# Patient Record
Sex: FEMALE | Race: WHITE | NOT HISPANIC OR LATINO | Employment: STUDENT | URBAN - METROPOLITAN AREA
[De-identification: names, ages, dates, MRNs, and addresses within clinical notes are randomized per-mention and may not be internally consistent; named-entity substitution may affect disease eponyms.]

---
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
## 2019-07-09 ENCOUNTER — HOSPITAL ENCOUNTER (EMERGENCY)
Facility: HOSPITAL | Age: 19
Discharge: HOME | End: 2019-07-09
Attending: EMERGENCY MEDICINE
Payer: MEDICAID

## 2019-07-09 VITALS
RESPIRATION RATE: 18 BRPM | WEIGHT: 160 LBS | OXYGEN SATURATION: 98 % | BODY MASS INDEX: 30.21 KG/M2 | DIASTOLIC BLOOD PRESSURE: 66 MMHG | HEIGHT: 61 IN | SYSTOLIC BLOOD PRESSURE: 110 MMHG | TEMPERATURE: 98 F | HEART RATE: 93 BPM

## 2019-07-09 DIAGNOSIS — J02.9 PHARYNGITIS, UNSPECIFIED ETIOLOGY: ICD-10-CM

## 2019-07-09 DIAGNOSIS — R51.9 NONINTRACTABLE HEADACHE, UNSPECIFIED CHRONICITY PATTERN, UNSPECIFIED HEADACHE TYPE: Primary | ICD-10-CM

## 2019-07-09 LAB
B-HCG UR QL: NEGATIVE
BILIRUB UR QL STRIP: NEGATIVE
CLARITY UR: CLEAR
COLOR UR: YELLOW
CTP QC/QA: YES
DEPRECATED S PYO AG THROAT QL EIA: NEGATIVE
GLUCOSE UR QL STRIP: NEGATIVE
HGB UR QL STRIP: NEGATIVE
KETONES UR QL STRIP: NEGATIVE
LEUKOCYTE ESTERASE UR QL STRIP: NEGATIVE
NITRITE UR QL STRIP: NEGATIVE
PH UR STRIP: 5 [PH] (ref 5–8)
PROT UR QL STRIP: NEGATIVE
SP GR UR STRIP: 1.01 (ref 1–1.03)
URN SPEC COLLECT METH UR: NORMAL
UROBILINOGEN UR STRIP-ACNC: NEGATIVE EU/DL

## 2019-07-09 PROCEDURE — 81025 URINE PREGNANCY TEST: CPT | Performed by: PHYSICIAN ASSISTANT

## 2019-07-09 PROCEDURE — 87880 STREP A ASSAY W/OPTIC: CPT

## 2019-07-09 PROCEDURE — 25000003 PHARM REV CODE 250: Performed by: PHYSICIAN ASSISTANT

## 2019-07-09 PROCEDURE — 81003 URINALYSIS AUTO W/O SCOPE: CPT

## 2019-07-09 PROCEDURE — 87081 CULTURE SCREEN ONLY: CPT

## 2019-07-09 PROCEDURE — 99283 EMERGENCY DEPT VISIT LOW MDM: CPT

## 2019-07-09 RX ORDER — IBUPROFEN 600 MG/1
600 TABLET ORAL
Status: COMPLETED | OUTPATIENT
Start: 2019-07-09 | End: 2019-07-09

## 2019-07-09 RX ORDER — MELOXICAM 7.5 MG/1
7.5 TABLET ORAL DAILY
Qty: 12 TABLET | Refills: 0 | Status: SHIPPED | OUTPATIENT
Start: 2019-07-09 | End: 2020-02-07 | Stop reason: HOSPADM

## 2019-07-09 RX ORDER — ACETAMINOPHEN 500 MG
1000 TABLET ORAL
Status: COMPLETED | OUTPATIENT
Start: 2019-07-09 | End: 2019-07-09

## 2019-07-09 RX ADMIN — IBUPROFEN 600 MG: 600 TABLET ORAL at 02:07

## 2019-07-09 RX ADMIN — ACETAMINOPHEN 1000 MG: 500 TABLET ORAL at 02:07

## 2019-07-09 NOTE — ED PROVIDER NOTES
Encounter Date: 7/9/2019       History     Chief Complaint   Patient presents with    Fever     Pt with 4 day hx of fever, headache, ear pain and body aches. Pt last ran fever yesterday. Last took Motrin yesterday.     Headache     18-year-old female with no past medical history presents to emergency department for 4 day by temporal headache that is gradual in onset.  Notes associated sore throat, bilateral otalgia, nausea, and mild periumbilical abdominal pain that is intermittent.  Temporary relief with Motrin.  No medications taken prior to arrival.  Notes that she has sick contacts at home with similar symptoms. Denies emesis, urinary symptoms, diarrhea, chest pain, shortness of breath. No history of similar symptoms.  No recent use of antibiotics or hospitalization.  No history of pneumonia.        Review of patient's allergies indicates:   Allergen Reactions    Penicillins      Past Medical History:   Diagnosis Date    Herpes      History reviewed. No pertinent surgical history.  Family History   Problem Relation Age of Onset    Breast cancer Neg Hx     Colon cancer Neg Hx     Ovarian cancer Neg Hx      Social History     Tobacco Use    Smoking status: Never Smoker    Smokeless tobacco: Never Used   Substance Use Topics    Alcohol use: No    Drug use: No     Review of Systems   Constitutional: Positive for fever.   HENT: Positive for ear pain and sore throat. Negative for congestion, trouble swallowing and voice change.    Respiratory: Positive for cough. Negative for shortness of breath and wheezing.    Cardiovascular: Negative for chest pain.   Gastrointestinal: Positive for abdominal pain. Negative for diarrhea, nausea and vomiting.   Genitourinary: Negative for dysuria, flank pain, frequency, hematuria, urgency, vaginal bleeding and vaginal discharge.   Musculoskeletal: Negative for back pain and neck pain.   Skin: Negative for rash.   Neurological: Positive for headaches.   All other systems  reviewed and are negative.      Physical Exam     Initial Vitals [07/09/19 1316]   BP Pulse Resp Temp SpO2   120/68 110 20 98.7 °F (37.1 °C) 97 %      MAP       --         Physical Exam    Nursing note and vitals reviewed.  Constitutional: She appears well-developed and well-nourished. She is not diaphoretic. No distress.   HENT:   Head: Normocephalic and atraumatic.   Right Ear: Tympanic membrane, external ear and ear canal normal. No mastoid tenderness.   Left Ear: Tympanic membrane, external ear and ear canal normal. No mastoid tenderness.   Nose: Nose normal. Right sinus exhibits no maxillary sinus tenderness and no frontal sinus tenderness. Left sinus exhibits no maxillary sinus tenderness and no frontal sinus tenderness.   Mouth/Throat: Uvula is midline and oropharynx is clear and moist. No trismus in the jaw. No dental abscesses or uvula swelling. No oropharyngeal exudate, posterior oropharyngeal edema, posterior oropharyngeal erythema or tonsillar abscesses.   Eyes: Conjunctivae and EOM are normal. Right eye exhibits no discharge. Left eye exhibits no discharge.   Neck: Normal range of motion. No tracheal deviation present. No JVD present.   Cardiovascular: Normal rate, regular rhythm and normal heart sounds. Exam reveals no friction rub.    No murmur heard.  Pulmonary/Chest: Breath sounds normal. No stridor. No respiratory distress. She has no wheezes. She has no rhonchi. She has no rales. She exhibits no tenderness.   Abdominal: Soft. She exhibits no distension. There is no tenderness. There is no rigidity, no rebound, no guarding, no CVA tenderness, no tenderness at McBurney's point and negative Schulte's sign.   Musculoskeletal: Normal range of motion.   Lymphadenopathy:     She has cervical adenopathy (bilateral mildly tender anterior; worse on the R).   Neurological: She is alert and oriented to person, place, and time.   Skin: Skin is warm and dry. No rash and no abscess noted. No erythema. No pallor.          ED Course   Procedures  Labs Reviewed   THROAT SCREEN, RAPID   CULTURE, STREP A,  THROAT   URINALYSIS, REFLEX TO URINE CULTURE    Narrative:     Preferred Collection Type->Urine, Clean Catch   POCT URINE PREGNANCY          Imaging Results    None          Medical Decision Making:   History:   Old Medical Records: I decided to obtain old medical records.  Initial Assessment:   18-year-old female with headache and neck pain  Clinical Tests:   Lab Tests: Ordered and Reviewed  ED Management:  Strep negative. No peritonsillar abscess or meningeal signs. No acute otitis media, otitis externa, or mastoiditis.  Low suspicion for pneumonia this time.  No UTI.  Not consistent with pyelonephritis.  Also carefully considered but doubt acute appendicitis, obstruction, and ureteral stone.  Does not endorse symptoms concerning for PID at this time. Kl[place] SAH negative. I am unsure of the etiology of this patient's symptoms, however, I do not believe they are of emergent etiology at this time. Likely viral.  Symptoms improved in the ED.  Advising follow-up with PCP.  Strict return precautions discussed with patient.  Patient agreeable to plan.                      Clinical Impression:       ICD-10-CM ICD-9-CM   1. Nonintractable headache, unspecified chronicity pattern, unspecified headache type R51 784.0   2. Pharyngitis, unspecified etiology J02.9 462                                Anish Monterroso PA-C  07/09/19 1512

## 2019-07-11 LAB — BACTERIA THROAT CULT: NORMAL

## 2020-01-17 ENCOUNTER — HOSPITAL ENCOUNTER (EMERGENCY)
Facility: HOSPITAL | Age: 20
Discharge: HOME | End: 2020-01-17
Attending: EMERGENCY MEDICINE
Payer: MEDICAID

## 2020-01-17 VITALS
WEIGHT: 160 LBS | TEMPERATURE: 98 F | RESPIRATION RATE: 16 BRPM | BODY MASS INDEX: 30.21 KG/M2 | DIASTOLIC BLOOD PRESSURE: 62 MMHG | HEIGHT: 61 IN | SYSTOLIC BLOOD PRESSURE: 116 MMHG | OXYGEN SATURATION: 99 % | HEART RATE: 87 BPM

## 2020-01-17 DIAGNOSIS — N30.01 ACUTE CYSTITIS WITH HEMATURIA: Primary | ICD-10-CM

## 2020-01-17 LAB
B-HCG UR QL: NEGATIVE
BILIRUBIN, POC UA: NEGATIVE
BLOOD, POC UA: ABNORMAL
CLARITY, POC UA: ABNORMAL
COLOR, POC UA: ABNORMAL
CTP QC/QA: YES
GLUCOSE, POC UA: NEGATIVE
KETONES, POC UA: NEGATIVE
LEUKOCYTE EST, POC UA: ABNORMAL
NITRITE, POC UA: NEGATIVE
PH UR STRIP: 5.5 [PH]
PROTEIN, POC UA: ABNORMAL
SPECIFIC GRAVITY, POC UA: >=1.03
UROBILINOGEN, POC UA: 1 E.U./DL

## 2020-01-17 PROCEDURE — 81003 URINALYSIS AUTO W/O SCOPE: CPT | Mod: ER

## 2020-01-17 PROCEDURE — 99284 EMERGENCY DEPT VISIT MOD MDM: CPT | Mod: 25,ER

## 2020-01-17 PROCEDURE — 81025 URINE PREGNANCY TEST: CPT | Mod: ER | Performed by: EMERGENCY MEDICINE

## 2020-01-17 RX ORDER — NITROFURANTOIN 25; 75 MG/1; MG/1
100 CAPSULE ORAL 2 TIMES DAILY
Qty: 10 CAPSULE | Refills: 0 | Status: SHIPPED | OUTPATIENT
Start: 2020-01-17 | End: 2020-01-22

## 2020-01-17 RX ORDER — PHENAZOPYRIDINE HYDROCHLORIDE 200 MG/1
200 TABLET, FILM COATED ORAL 3 TIMES DAILY
Qty: 6 TABLET | Refills: 0 | Status: SHIPPED | OUTPATIENT
Start: 2020-01-17 | End: 2020-01-27

## 2020-01-17 NOTE — ED PROVIDER NOTES
Encounter Date: 1/17/2020       History     Chief Complaint   Patient presents with    Dysuria     pt c/o dysuria and hematuria x 3 days     19 y.o. female presents to emergency department complaining of acute dysuria, hematuria, urgency and frequency that began three days ago.  She denies abdominal pain, flank pain, fever, nausea, vomiting or vaginal discharge.  She reports recently being treated for strep throat and states she has completed a 10 day course of Bactrim 3 days ago.        Review of patient's allergies indicates:   Allergen Reactions    Amoxicillin     Penicillins      Past Medical History:   Diagnosis Date    Herpes      Past Surgical History:   Procedure Laterality Date    lasix       Family History   Problem Relation Age of Onset    Breast cancer Neg Hx     Colon cancer Neg Hx     Ovarian cancer Neg Hx      Social History     Tobacco Use    Smoking status: Never Smoker    Smokeless tobacco: Never Used   Substance Use Topics    Alcohol use: No    Drug use: No     Review of Systems   Constitutional: Negative for fever.   Gastrointestinal: Negative for abdominal pain, constipation, diarrhea, nausea and vomiting.   Genitourinary: Positive for dysuria, frequency and hematuria. Negative for decreased urine volume, difficulty urinating, flank pain and vaginal discharge.   All other systems reviewed and are negative.      Physical Exam     Initial Vitals [01/17/20 0143]   BP Pulse Resp Temp SpO2   110/64 94 18 98.1 °F (36.7 °C) 98 %      MAP       --         Physical Exam    Nursing note and vitals reviewed.  Constitutional: She appears well-developed and well-nourished. She is not diaphoretic. No distress.   HENT:   Head: Normocephalic and atraumatic.   Eyes: Conjunctivae are normal. Pupils are equal, round, and reactive to light.   Neck: Normal range of motion. Neck supple.   Cardiovascular: Normal rate and intact distal pulses.   Pulmonary/Chest: No accessory muscle usage. No tachypnea. No  respiratory distress.   Abdominal: She exhibits no distension. There is no tenderness.   Musculoskeletal: Normal range of motion. She exhibits no tenderness.   Neurological: She is alert and oriented to person, place, and time. She has normal strength. Gait normal. GCS eye subscore is 4. GCS verbal subscore is 5. GCS motor subscore is 6.   Skin: Skin is warm. Capillary refill takes less than 2 seconds.   Psychiatric: She has a normal mood and affect.         ED Course   Procedures  Labs Reviewed   POCT URINALYSIS W/O SCOPE - Abnormal; Notable for the following components:       Result Value    Spec Grav UA >=1.030 (*)     Blood, UA 3+ (*)     Protein, UA 3+ (*)     Leukocytes, UA 1+ (*)     All other components within normal limits   CULTURE, URINE   POCT URINE PREGNANCY   POCT URINALYSIS W/O SCOPE          Imaging Results    None                       Labs Reviewed  Admission on 01/17/2020   Component Date Value Ref Range Status    Glucose, UA 01/17/2020 Negative   Final    Bilirubin, UA 01/17/2020 Negative   Final    Ketones, UA 01/17/2020 Negative   Final    Spec Grav UA 01/17/2020 >=1.030*  Final    Blood, UA 01/17/2020 3+*  Final    PH, UA 01/17/2020 5.5   Final    Protein, UA 01/17/2020 3+*  Final    Urobilinogen, UA 01/17/2020 1.0  E.U./dL Final    Nitrite, UA 01/17/2020 Negative   Final    Leukocytes, UA 01/17/2020 1+*  Final    Color, UA 01/17/2020 Melina   Final    Clarity, UA 01/17/2020 Cloudy   Final    POC Preg Test, Ur 01/17/2020 Negative  Negative Final     Acceptable 01/17/2020 Yes   Final        Imaging Reviewed    Imaging Results    None         Medications given in ED    Medications - No data to display    Note was created using voice recognition software. Note may have occasional typographical errors that may not have been identified and edited despite good michelle initial review prior to signing.                   Discharge Medications     Medication List with  Changes/Refills   New Medications    NITROFURANTOIN, MACROCRYSTAL-MONOHYDRATE, (MACROBID) 100 MG CAPSULE    Take 1 capsule (100 mg total) by mouth 2 (two) times daily. for 5 days    PHENAZOPYRIDINE (PYRIDIUM) 200 MG TABLET    Take 1 tablet (200 mg total) by mouth 3 (three) times daily. for 10 days   Current Medications    MELOXICAM (MOBIC) 7.5 MG TABLET    Take 1 tablet (7.5 mg total) by mouth once daily.    PRENATAL VIT,KIMBERLEE 74-IRON-FOLIC (PRENAPLUS) 27 MG IRON- 1 MG TAB    Take 1 tablet by mouth once daily.    VALACYCLOVIR (VALTREX) 500 MG TABLET    Take 1 tablet (500 mg total) by mouth once daily.             Patient discharged to home in stable condition with instructions to:   1. Please take all meds as prescribed.  2. Follow-up with your primary care doctor   3. Return precautions discussed and patient and/or family/caretaker understands to return to the emergency room for any concerns including worsening of your current symptoms, fever, chills, night sweats, worsening pain, chest pain, shortness of breath, nausea, vomiting, diarrhea, bleeding, headache, difficulty talking, visual disturbances, weakness, numbness or any other acute concerns        Clinical Impression:       ICD-10-CM ICD-9-CM   1. Acute cystitis with hematuria N30.01 595.0         Disposition:   Disposition: Discharged  Condition: Stable                     Walter Mijares MD  01/17/20 0700

## 2020-02-07 ENCOUNTER — HOSPITAL ENCOUNTER (EMERGENCY)
Facility: HOSPITAL | Age: 20
Discharge: HOME | End: 2020-02-07
Attending: EMERGENCY MEDICINE
Payer: MEDICAID

## 2020-02-07 VITALS
HEIGHT: 61 IN | RESPIRATION RATE: 18 BRPM | SYSTOLIC BLOOD PRESSURE: 107 MMHG | DIASTOLIC BLOOD PRESSURE: 58 MMHG | TEMPERATURE: 99 F | WEIGHT: 160 LBS | BODY MASS INDEX: 30.21 KG/M2 | OXYGEN SATURATION: 98 % | HEART RATE: 91 BPM

## 2020-02-07 DIAGNOSIS — O26.891 ABDOMINAL PAIN DURING PREGNANCY IN FIRST TRIMESTER: Primary | ICD-10-CM

## 2020-02-07 DIAGNOSIS — R10.9 ABDOMINAL PAIN DURING PREGNANCY IN FIRST TRIMESTER: Primary | ICD-10-CM

## 2020-02-07 DIAGNOSIS — Z34.91 FIRST TRIMESTER PREGNANCY: ICD-10-CM

## 2020-02-07 LAB
ALBUMIN SERPL BCP-MCNC: 4.5 G/DL (ref 3.5–5.2)
ALP SERPL-CCNC: 89 U/L (ref 55–135)
ALT SERPL W/O P-5'-P-CCNC: 18 U/L (ref 10–44)
ANION GAP SERPL CALC-SCNC: 9 MMOL/L (ref 8–16)
AST SERPL-CCNC: 15 U/L (ref 10–40)
B-HCG UR QL: POSITIVE
BACTERIA GENITAL QL WET PREP: ABNORMAL
BASOPHILS # BLD AUTO: 0.04 K/UL (ref 0–0.2)
BASOPHILS NFR BLD: 0.3 % (ref 0–1.9)
BILIRUB SERPL-MCNC: 0.4 MG/DL (ref 0.1–1)
BILIRUB UR QL STRIP: NEGATIVE
BUN SERPL-MCNC: 10 MG/DL (ref 6–20)
CALCIUM SERPL-MCNC: 9.8 MG/DL (ref 8.7–10.5)
CHLORIDE SERPL-SCNC: 106 MMOL/L (ref 95–110)
CLARITY UR: CLEAR
CLUE CELLS VAG QL WET PREP: ABNORMAL
CO2 SERPL-SCNC: 25 MMOL/L (ref 23–29)
COLOR UR: YELLOW
CREAT SERPL-MCNC: 0.7 MG/DL (ref 0.5–1.4)
CTP QC/QA: YES
DIFFERENTIAL METHOD: ABNORMAL
EOSINOPHIL # BLD AUTO: 0.3 K/UL (ref 0–0.5)
EOSINOPHIL NFR BLD: 1.8 % (ref 0–8)
ERYTHROCYTE [DISTWIDTH] IN BLOOD BY AUTOMATED COUNT: 13.2 % (ref 11.5–14.5)
EST. GFR  (AFRICAN AMERICAN): >60 ML/MIN/1.73 M^2
EST. GFR  (NON AFRICAN AMERICAN): >60 ML/MIN/1.73 M^2
FILAMENT FUNGI VAG WET PREP-#/AREA: ABNORMAL
GLUCOSE SERPL-MCNC: 92 MG/DL (ref 70–110)
GLUCOSE UR QL STRIP: NEGATIVE
HCG INTACT+B SERPL-ACNC: 5393 MIU/ML
HCT VFR BLD AUTO: 43.4 % (ref 37–48.5)
HGB BLD-MCNC: 14.2 G/DL (ref 12–16)
HGB UR QL STRIP: NEGATIVE
IMM GRANULOCYTES # BLD AUTO: 0.05 K/UL (ref 0–0.04)
IMM GRANULOCYTES NFR BLD AUTO: 0.4 % (ref 0–0.5)
KETONES UR QL STRIP: ABNORMAL
LEUKOCYTE ESTERASE UR QL STRIP: NEGATIVE
LYMPHOCYTES # BLD AUTO: 3.1 K/UL (ref 1–4.8)
LYMPHOCYTES NFR BLD: 21.9 % (ref 18–48)
MCH RBC QN AUTO: 29.5 PG (ref 27–31)
MCHC RBC AUTO-ENTMCNC: 32.7 G/DL (ref 32–36)
MCV RBC AUTO: 90 FL (ref 82–98)
MONOCYTES # BLD AUTO: 0.7 K/UL (ref 0.3–1)
MONOCYTES NFR BLD: 5.1 % (ref 4–15)
NEUTROPHILS # BLD AUTO: 10 K/UL (ref 1.8–7.7)
NEUTROPHILS NFR BLD: 70.5 % (ref 38–73)
NITRITE UR QL STRIP: NEGATIVE
NRBC BLD-RTO: 0 /100 WBC
PH UR STRIP: 6 [PH] (ref 5–8)
PLATELET # BLD AUTO: 253 K/UL (ref 150–350)
PMV BLD AUTO: 12.1 FL (ref 9.2–12.9)
POTASSIUM SERPL-SCNC: 4.4 MMOL/L (ref 3.5–5.1)
PROT SERPL-MCNC: 7.8 G/DL (ref 6–8.4)
PROT UR QL STRIP: NEGATIVE
RBC # BLD AUTO: 4.81 M/UL (ref 4–5.4)
SODIUM SERPL-SCNC: 140 MMOL/L (ref 136–145)
SP GR UR STRIP: 1.03 (ref 1–1.03)
SPECIMEN SOURCE: ABNORMAL
T VAGINALIS GENITAL QL WET PREP: ABNORMAL
URN SPEC COLLECT METH UR: ABNORMAL
UROBILINOGEN UR STRIP-ACNC: ABNORMAL EU/DL
WBC # BLD AUTO: 14.19 K/UL (ref 3.9–12.7)
WBC #/AREA VAG WET PREP: ABNORMAL
YEAST GENITAL QL WET PREP: ABNORMAL

## 2020-02-07 PROCEDURE — 81003 URINALYSIS AUTO W/O SCOPE: CPT

## 2020-02-07 PROCEDURE — 80053 COMPREHEN METABOLIC PANEL: CPT

## 2020-02-07 PROCEDURE — 84702 CHORIONIC GONADOTROPIN TEST: CPT

## 2020-02-07 PROCEDURE — 96361 HYDRATE IV INFUSION ADD-ON: CPT

## 2020-02-07 PROCEDURE — 63600175 PHARM REV CODE 636 W HCPCS: Performed by: PHYSICIAN ASSISTANT

## 2020-02-07 PROCEDURE — 81025 URINE PREGNANCY TEST: CPT | Performed by: PHYSICIAN ASSISTANT

## 2020-02-07 PROCEDURE — 96360 HYDRATION IV INFUSION INIT: CPT

## 2020-02-07 PROCEDURE — 99284 EMERGENCY DEPT VISIT MOD MDM: CPT | Mod: 25

## 2020-02-07 PROCEDURE — 87491 CHLMYD TRACH DNA AMP PROBE: CPT

## 2020-02-07 PROCEDURE — 87210 SMEAR WET MOUNT SALINE/INK: CPT

## 2020-02-07 PROCEDURE — 85025 COMPLETE CBC W/AUTO DIFF WBC: CPT

## 2020-02-07 RX ADMIN — SODIUM CHLORIDE 1000 ML: 0.9 INJECTION, SOLUTION INTRAVENOUS at 08:02

## 2020-02-08 NOTE — ED PROVIDER NOTES
Encounter Date: 2020    SCRIBE #1 NOTE: I, Daily Ware, am scribing for, and in the presence of,  Nikki Brower PA-C. I have scribed the following portions of the note - Other sections scribed: HPI, PE.       History     Chief Complaint   Patient presents with    Abdominal Pain     reports lower abdominal back, lower back pain,. been ongoing for 1 week. denies urinary symptoms. denies vaginal bleeding. LMP Dec 22nd. reports tooke a home pregnancy test 3 days ago and it stated hse was pregnant     CC: Abdominal Pain    HPI:  This is a 19 y.o. female  who presents to the Emergency Department with a cc of constant lower abdominal cramping that began one week ago. Th patient reports associated back pain, nausea, lightheadedness, and headache. Patient reports a positive pregnancy test 3 days ago. She states that this pregnancy feels different from her previous pregnancy. Her last menstrual period began  and lasted 12 days. She mentions that her periods are typically irregular. Patient has not seen an OBGYN or received an Ultrasound. Patient reports having an UTI in January but has finished her ABx course. No concern for STD.No vaginal discharge, vaginal bleeding, dysuria, or hematuria. No vomiting. No fever.        The history is provided by the patient.     Review of patient's allergies indicates:   Allergen Reactions    Amoxicillin     Penicillins      Past Medical History:   Diagnosis Date    Herpes      Past Surgical History:   Procedure Laterality Date    lasix       Family History   Problem Relation Age of Onset    Breast cancer Neg Hx     Colon cancer Neg Hx     Ovarian cancer Neg Hx      Social History     Tobacco Use    Smoking status: Never Smoker    Smokeless tobacco: Never Used   Substance Use Topics    Alcohol use: No    Drug use: No     Review of Systems   Constitutional: Negative for chills and fever.   HENT: Negative for congestion.    Respiratory: Negative for cough and  shortness of breath.    Cardiovascular: Negative for chest pain.   Gastrointestinal: Positive for abdominal pain and nausea. Negative for constipation, diarrhea and vomiting.   Genitourinary: Positive for pelvic pain. Negative for dysuria, flank pain, vaginal bleeding and vaginal discharge.   Musculoskeletal: Negative for myalgias.   Skin: Negative for rash.   Allergic/Immunologic: Negative for immunocompromised state.   Neurological: Negative for weakness.   Hematological: Does not bruise/bleed easily.   Psychiatric/Behavioral: Negative for confusion.       Physical Exam     Initial Vitals [02/07/20 1843]   BP Pulse Resp Temp SpO2   (!) 103/59 100 15 98.4 °F (36.9 °C) 100 %      MAP       --         Physical Exam    Vitals reviewed.  Constitutional: She appears well-developed and well-nourished. She is not diaphoretic. No distress.   HENT:   Head: Normocephalic and atraumatic.   Mouth/Throat: Oropharynx is clear and moist.   Eyes: Conjunctivae and EOM are normal.   Neck: Neck supple.   Cardiovascular: Normal rate, regular rhythm, normal heart sounds and intact distal pulses.   Pulmonary/Chest: Breath sounds normal.   Abdominal: Soft. Normal appearance. She exhibits no distension. There is tenderness in the right lower quadrant, suprapubic area and left lower quadrant. There is no rigidity, no rebound, no guarding, no CVA tenderness and negative Schulte's sign.   Tenderness to pelvic and suprapubic region.   Genitourinary: Vagina normal. Cervix exhibits no motion tenderness, no discharge and no friability. Right adnexum displays no mass. Left adnexum displays no mass. No vaginal discharge found.   Neurological: She is alert and oriented to person, place, and time.   Skin: Skin is warm and dry.         ED Course   Procedures  Labs Reviewed   URINALYSIS, REFLEX TO URINE CULTURE - Abnormal; Notable for the following components:       Result Value    Ketones, UA Trace (*)     Urobilinogen, UA 4.0-6.0 (*)     All other  components within normal limits    Narrative:     Preferred Collection Type->Urine, Clean Catch   CBC W/ AUTO DIFFERENTIAL - Abnormal; Notable for the following components:    WBC 14.19 (*)     Gran # (ANC) 10.0 (*)     Immature Grans (Abs) 0.05 (*)     All other components within normal limits   VAGINAL SCREEN - Abnormal; Notable for the following components:    WBC - Vaginal Screen Occasional (*)     Bacteria - Vaginal Screen Occasional (*)     All other components within normal limits   POCT URINE PREGNANCY - Abnormal; Notable for the following components:    POC Preg Test, Ur Positive (*)     All other components within normal limits   C. TRACHOMATIS/N. GONORRHOEAE BY AMP DNA   COMPREHENSIVE METABOLIC PANEL   HCG, QUANTITATIVE, PREGNANCY          Imaging Results          US OB <14 Wks TransAbd & TransVag, Single Gestation (XPD) (Final result)  Result time 02/07/20 22:07:59    Final result by Christa Sarkar MD (02/07/20 22:07:59)                 Impression:      Early IUP with mean sac diameter corresponding to a gestational age of 5 weeks 1 day.  No fetal pole at this time.      Electronically signed by: Christa Sarkar  Date:    02/07/2020  Time:    22:07             Narrative:    EXAMINATION:  ULTRASOUND OBSTETRICAL ULTRASOUND LESS THAN 14 WEEKS WITH TRANSVAGINAL    CLINICAL HISTORY:  pelvic pain , pregnant;    TECHNIQUE:  Real-time ultrasound obstetrical ultrasound less than 14 weeks was performed transabdominally and  transvaginally.    COMPARISON:  None.    FINDINGS:  The uterus measures 8.5 x 5.3 x 5.1 cm. There are no uterine masses. A yolk sac and gestational sac are all visualized.  The mean sac diameter measured 0.65 cm, which corresponds to a gestational age of 5 weeks and 1 day.  No fetal pole is visualized at this time.    The right ovary measures 3.3 x 2.2 x 2.3 cm. The left ovary measures 1.9 x 1.6 x 2.5 cm.  Flow is seen in both ovaries.    Small free fluid is seen in the posterior  cul-de-sac.                                       APC / Resident Notes:   Patient was seen in the ER promptly upon arrival.  She is afebrile, no acute distress. Physical examination reveals tenderness on palpation to the lower abdomen bilaterally as well as the suprapubic region.  No CVA tenderness on exam.  Abdomen is otherwise soft, nondistended.  Pelvic examination was done with nursing staff present in the room.  No CMT or adnexal tenderness on bimanual examination. No abnormal vaginal discharge noted.    IV access established, labs ordered, fluids given.  Laboratory studies show a mild leukocytosis of 14.1.  Hemoglobin is stable. Chemistries were found to be unremarkable. Pregnancy test is positive.  Beta HCG is 5393.  Urinalysis negative for infection or blood.  Wet prep unremarkable.    Ultrasound reveals early IUP 5 weeks and 1 day.  No fetal pole at this time.  Patient advised to follow up with OBGYN for further monitoring care.  She was advised only take Tylenol for pain if needed.  She was given strict return precautions the ED which was agreeable to.  She is stable for discharge and close follow-up at this time.         Scribe Attestation:   Scribe #1: I performed the above scribed service and the documentation accurately describes the services I performed. I attest to the accuracy of the note.                          Clinical Impression:       ICD-10-CM ICD-9-CM   1. Abdominal pain during pregnancy in first trimester O26.891 646.83    R10.9 789.00   2. First trimester pregnancy Z34.91 V22.2         Disposition:   Disposition: Discharged  Condition: Stable    I, Nikki Hahn personally performed the services described in this documentation. All medical record entries made by the scribe were at my direction and in my presence.  I have reviewed the chart and agree that the record reflects my personal performance and is accurate and complete. Nikki Hahn PA-C  10:13 PM 02/07/2020                  Nikki Brower PA-C  02/07/20 [id number]

## 2020-02-10 ENCOUNTER — TELEPHONE (OUTPATIENT)
Dept: OBSTETRICS AND GYNECOLOGY | Facility: CLINIC | Age: 20
End: 2020-02-10

## 2020-02-10 DIAGNOSIS — Z36.89 ENCOUNTER TO ESTABLISH GESTATIONAL AGE USING ULTRASOUND: Primary | ICD-10-CM

## 2020-02-10 LAB
C TRACH DNA SPEC QL NAA+PROBE: NOT DETECTED
N GONORRHOEA DNA SPEC QL NAA+PROBE: NOT DETECTED

## 2020-02-10 NOTE — TELEPHONE ENCOUNTER
----- Message from Karina Kelley sent at 2/10/2020  3:56 PM CST -----  Can you put in order for /S      [facility] 12/25/2019

## 2020-03-04 ENCOUNTER — TELEPHONE (OUTPATIENT)
Dept: OBSTETRICS AND GYNECOLOGY | Facility: CLINIC | Age: 20
End: 2020-03-04

## 2020-03-04 NOTE — TELEPHONE ENCOUNTER
Tried contacting patient to schedule a dating ultrasound. Left voicemail for patient to contact office back.

## 2020-07-01 LAB
ABO + RH BLD: NORMAL
C TRACH RRNA SPEC QL PROBE: NEGATIVE
HBV SURFACE AG SERPL QL IA: NEGATIVE
HEMOGLOBIN BANDS: NORMAL
HIV 1+2 AB+HIV1 P24 AG SERPL QL IA: NEGATIVE
INDIRECT COOMBS: NEGATIVE
N GONORRHOEAE, AMPLIFIED DNA: NEGATIVE
RPR: NEGATIVE
RUBELLA IMMUNE STATUS: NORMAL
URINE CULTURE, ROUTINE: NEGATIVE

## 2020-07-21 ENCOUNTER — HOSPITAL ENCOUNTER (EMERGENCY)
Facility: HOSPITAL | Age: 20
Discharge: HOME | End: 2020-07-21
Attending: EMERGENCY MEDICINE
Payer: MEDICAID

## 2020-07-21 VITALS
HEIGHT: 61 IN | HEART RATE: 84 BPM | SYSTOLIC BLOOD PRESSURE: 102 MMHG | RESPIRATION RATE: 16 BRPM | BODY MASS INDEX: 28.51 KG/M2 | TEMPERATURE: 98 F | OXYGEN SATURATION: 100 % | WEIGHT: 151 LBS | DIASTOLIC BLOOD PRESSURE: 71 MMHG

## 2020-07-21 DIAGNOSIS — O21.0 HYPEREMESIS GRAVIDARUM: Primary | ICD-10-CM

## 2020-07-21 DIAGNOSIS — O46.90 VAGINAL BLEEDING DURING PREGNANCY: ICD-10-CM

## 2020-07-21 LAB
ALBUMIN SERPL BCP-MCNC: 4.1 G/DL (ref 3.5–5.2)
ALP SERPL-CCNC: 72 U/L (ref 55–135)
ALT SERPL W/O P-5'-P-CCNC: 13 U/L (ref 10–44)
ANION GAP SERPL CALC-SCNC: 7 MMOL/L (ref 8–16)
AST SERPL-CCNC: 15 U/L (ref 10–40)
BACTERIA #/AREA URNS HPF: NORMAL /HPF
BASOPHILS # BLD AUTO: 0.03 K/UL (ref 0–0.2)
BASOPHILS NFR BLD: 0.2 % (ref 0–1.9)
BILIRUB SERPL-MCNC: 0.3 MG/DL (ref 0.1–1)
BILIRUB UR QL STRIP: NEGATIVE
BUN SERPL-MCNC: 9 MG/DL (ref 6–20)
CALCIUM SERPL-MCNC: 9.2 MG/DL (ref 8.7–10.5)
CHLORIDE SERPL-SCNC: 104 MMOL/L (ref 95–110)
CLARITY UR: CLEAR
CO2 SERPL-SCNC: 24 MMOL/L (ref 23–29)
COLOR UR: YELLOW
CREAT SERPL-MCNC: 0.7 MG/DL (ref 0.5–1.4)
DIFFERENTIAL METHOD: ABNORMAL
EOSINOPHIL # BLD AUTO: 0.2 K/UL (ref 0–0.5)
EOSINOPHIL NFR BLD: 1.4 % (ref 0–8)
ERYTHROCYTE [DISTWIDTH] IN BLOOD BY AUTOMATED COUNT: 13.6 % (ref 11.5–14.5)
EST. GFR  (AFRICAN AMERICAN): >60 ML/MIN/1.73 M^2
EST. GFR  (NON AFRICAN AMERICAN): >60 ML/MIN/1.73 M^2
GLUCOSE SERPL-MCNC: 84 MG/DL (ref 70–110)
GLUCOSE UR QL STRIP: NEGATIVE
HCG INTACT+B SERPL-ACNC: NORMAL MIU/ML
HCT VFR BLD AUTO: 41.2 % (ref 37–48.5)
HGB BLD-MCNC: 13.5 G/DL (ref 12–16)
HGB UR QL STRIP: NEGATIVE
IMM GRANULOCYTES # BLD AUTO: 0.03 K/UL (ref 0–0.04)
IMM GRANULOCYTES NFR BLD AUTO: 0.2 % (ref 0–0.5)
KETONES UR QL STRIP: NEGATIVE
LEUKOCYTE ESTERASE UR QL STRIP: NEGATIVE
LYMPHOCYTES # BLD AUTO: 2.5 K/UL (ref 1–4.8)
LYMPHOCYTES NFR BLD: 20.3 % (ref 18–48)
MCH RBC QN AUTO: 29.1 PG (ref 27–31)
MCHC RBC AUTO-ENTMCNC: 32.8 G/DL (ref 32–36)
MCV RBC AUTO: 89 FL (ref 82–98)
MICROSCOPIC COMMENT: NORMAL
MONOCYTES # BLD AUTO: 0.7 K/UL (ref 0.3–1)
MONOCYTES NFR BLD: 5.9 % (ref 4–15)
NEUTROPHILS # BLD AUTO: 8.8 K/UL (ref 1.8–7.7)
NEUTROPHILS NFR BLD: 72 % (ref 38–73)
NITRITE UR QL STRIP: NEGATIVE
NRBC BLD-RTO: 0 /100 WBC
PH UR STRIP: 5 [PH] (ref 5–8)
PLATELET # BLD AUTO: 251 K/UL (ref 150–350)
PMV BLD AUTO: 11.9 FL (ref 9.2–12.9)
POTASSIUM SERPL-SCNC: 4 MMOL/L (ref 3.5–5.1)
PROT SERPL-MCNC: 7.7 G/DL (ref 6–8.4)
PROT UR QL STRIP: NEGATIVE
RBC # BLD AUTO: 4.64 M/UL (ref 4–5.4)
RBC #/AREA URNS HPF: 3 /HPF (ref 0–4)
SODIUM SERPL-SCNC: 135 MMOL/L (ref 136–145)
SP GR UR STRIP: 1.02 (ref 1–1.03)
SQUAMOUS #/AREA URNS HPF: 4 /HPF
URN SPEC COLLECT METH UR: ABNORMAL
UROBILINOGEN UR STRIP-ACNC: ABNORMAL EU/DL
WBC # BLD AUTO: 12.17 K/UL (ref 3.9–12.7)
WBC #/AREA URNS HPF: 5 /HPF (ref 0–5)

## 2020-07-21 PROCEDURE — 25000003 PHARM REV CODE 250: Performed by: PHYSICIAN ASSISTANT

## 2020-07-21 PROCEDURE — 85025 COMPLETE CBC W/AUTO DIFF WBC: CPT

## 2020-07-21 PROCEDURE — 96361 HYDRATE IV INFUSION ADD-ON: CPT

## 2020-07-21 PROCEDURE — 84702 CHORIONIC GONADOTROPIN TEST: CPT

## 2020-07-21 PROCEDURE — 81000 URINALYSIS NONAUTO W/SCOPE: CPT

## 2020-07-21 PROCEDURE — 80053 COMPREHEN METABOLIC PANEL: CPT

## 2020-07-21 PROCEDURE — 63600175 PHARM REV CODE 636 W HCPCS: Performed by: PHYSICIAN ASSISTANT

## 2020-07-21 PROCEDURE — 99284 EMERGENCY DEPT VISIT MOD MDM: CPT | Mod: 25

## 2020-07-21 PROCEDURE — 96365 THER/PROPH/DIAG IV INF INIT: CPT

## 2020-07-21 RX ORDER — PROMETHAZINE HYDROCHLORIDE 25 MG/1
25 SUPPOSITORY RECTAL EVERY 6 HOURS PRN
Qty: 10 SUPPOSITORY | Refills: 0 | Status: ON HOLD | OUTPATIENT
Start: 2020-07-21 | End: 2021-03-01 | Stop reason: HOSPADM

## 2020-07-21 RX ADMIN — SODIUM CHLORIDE 1000 ML: 0.9 INJECTION, SOLUTION INTRAVENOUS at 05:07

## 2020-07-21 RX ADMIN — PROMETHAZINE HYDROCHLORIDE 12.5 MG: 25 INJECTION INTRAMUSCULAR; INTRAVENOUS at 05:07

## 2020-07-21 NOTE — Clinical Note
Jolly MARSH Collier was seen and treated in our emergency department on 7/21/2020.  She may return to work on 07/22/2020.       If you have any questions or concerns, please don't hesitate to call.      Whitney Hooper RN     Thank you for the recommendation. I will discuss this with her when I see her in April. She is doing very well on her current regimen and I believe it was started because the triglycerides were very high.

## 2020-07-21 NOTE — ED TRIAGE NOTES
Pt. States she is pregnant and has been having episodes of emesis. Pt. States she feels weak and tired. Pt. Reports she has not been able to

## 2020-07-21 NOTE — PROVIDER PROGRESS NOTES - EMERGENCY DEPT.
Emergency Department TeleTRIAGE Encounter Note      CHIEF COMPLAINT    Chief Complaint   Patient presents with    Emesis During Pregnancy     [facility] weeks [facility] states feels weak and been vomiting       VITAL SIGNS   Initial Vitals [07/21/20 1616]   BP Pulse Resp Temp SpO2   118/67 106 20 98.1 °F (36.7 °C) 100 %      MAP       --            ALLERGIES    Review of patient's allergies indicates:   Allergen Reactions    Amoxicillin     Penicillins        PROVIDER TRIAGE NOTE  This is a teletriage evaluation of a 19 y.o. female presenting to the ED with c/o nausea, vomiting, generalized weakness x7 days.  She is 7 weeks pregnant, previous ultrasound was single live IUP, followed by OBGYN wounds clinic. Initial orders will be placed and care will be transferred to an alternate provider when patient is roomed for a full evaluation. Any additional orders and the final disposition will be determined by that provider.         ORDERS  Labs Reviewed   CBC W/ AUTO DIFFERENTIAL   COMPREHENSIVE METABOLIC PANEL   URINALYSIS, REFLEX TO URINE CULTURE       ED Orders (720h ago, onward)    Start Ordered     Status Ordering Provider    07/21/20 1630 07/21/20 1622  sodium chloride 0.9% bolus 1,000 mL  ED 1 Time      Ordered DENICE MILLS    07/21/20 1630 07/21/20 1622  promethazine (PHENERGAN) 12.5 mg in dextrose 5 % 50 mL IVPB  ED 1 Time      Ordered DENICE MILLS    07/21/20 1622 07/21/20 1622  Insert peripheral IV  Continuous      Ordered DENICE MILLS    07/21/20 1622 07/21/20 1622  CBC auto differential  STAT  Collect    Ordered DENICE MILLS    07/21/20 1622 07/21/20 1622  Comprehensive metabolic panel  STAT  Collect    Ordered DENICE MILLS    07/21/20 1622 07/21/20 1622  Urinalysis, Reflex to Urine Culture Urine, Clean Catch  STAT      Ordered DENICE MILLS            Virtual Visit Note: The provider triage portion of this emergency department evaluation and documentation was performed  via [employer], a HIPAA-compliant telemedicine application, in concert with a tele-presenter in the room. A face to face patient evaluation with one of my colleagues will occur once the patient is placed in an emergency department room.      DISCLAIMER: This note was prepared with [employer] voice recognition transcription software. Garbled syntax, mangled pronouns, and other bizarre constructions may be attributed to that software system.

## 2020-07-21 NOTE — ED PROVIDER NOTES
Encounter Date: 2020    SCRIBE #1 NOTE: I, Nabor Mcintosh, am scribing for, and in the presence of,  Leroy Gamble PA-C. I have scribed the following portions of the note - Other sections scribed: HPI/ROS/PE.       History     Chief Complaint   Patient presents with    Emesis During Pregnancy     7 weeks pregnant (goes to [facility]). states feels weak and been vomiting     Pt seen by provider at 17:30    This 19 y.o. female, A1, who is 7w3d gravid with no pertinent medical history presents to the ED for an emergent evaluation of n/v x 1 week. Pt reports ~6 episodes of vomiting within the last 24 hours. She reports a few streaks of blood in vomit earlier today, but no hematemesis. Pt notes a decreased appetite within the past 2 days. Pt has attempted tx with Promethazine and Vitamin B12 previously that have been ineffective, as she vomits shortly after taking the medications. No alleviating factors. She sees an OB/GYN at the [facility]. Pt also notes mild vaginal spotting for a few days. No PSHx. No tobacco, EtOH, or IV drug use. Otherwise, pt denies fever, chills, abdominal pain, dysuria, hematuria, and any other associated symptoms.    The history is provided by the patient. No  was used.     Review of patient's allergies indicates:   Allergen Reactions    Amoxicillin     Penicillins      Past Medical History:   Diagnosis Date    Herpes      Past Surgical History:   Procedure Laterality Date    lasix       Family History   Problem Relation Age of Onset    Breast cancer Neg Hx     Colon cancer Neg Hx     Ovarian cancer Neg Hx      Social History     Tobacco Use    Smoking status: Never Smoker    Smokeless tobacco: Never Used   Substance Use Topics    Alcohol use: No    Drug use: No     Review of Systems   Constitutional: Positive for appetite change. Negative for chills and fever.   HENT: Negative for congestion, rhinorrhea and sore throat.     Eyes: Negative for visual disturbance.   Respiratory: Negative for cough and shortness of breath.    Cardiovascular: Negative for chest pain.   Gastrointestinal: Positive for nausea and vomiting. Negative for abdominal pain and diarrhea.        (-) Hematemesis    Genitourinary: Positive for vaginal bleeding (mild spotting). Negative for difficulty urinating, dysuria and hematuria.   Musculoskeletal: Negative for back pain, myalgias and neck stiffness.   Skin: Negative for rash.   Neurological: Negative for headaches.       Physical Exam     Initial Vitals [07/21/20 1616]   BP Pulse Resp Temp SpO2   118/67 106 20 98.1 °F (36.7 °C) 100 %      MAP       --         Physical Exam    Nursing note and vitals reviewed.  Constitutional: She appears well-developed and well-nourished. No distress.   HENT:   Head: Normocephalic and atraumatic.   Right Ear: Tympanic membrane normal.   Left Ear: Tympanic membrane normal.   Nose: Nose normal.   Mouth/Throat: Uvula is midline, oropharynx is clear and moist and mucous membranes are normal.   Eyes: EOM are normal. Pupils are equal, round, and reactive to light.   Neck: Trachea normal, normal range of motion, full passive range of motion without pain and phonation normal. Neck supple. No stridor present. No spinous process tenderness and no muscular tenderness present. Normal range of motion present. No neck rigidity.   Cardiovascular: Normal rate, regular rhythm and normal heart sounds. Exam reveals no gallop and no friction rub.    No murmur heard.  Pulmonary/Chest: Effort normal and breath sounds normal. No respiratory distress. She has no wheezes. She has no rhonchi. She has no rales.   Abdominal: Soft. Bowel sounds are normal. There is no abdominal tenderness. There is no rebound and no guarding.   Musculoskeletal: Normal range of motion.   Neurological: She is alert and oriented to person, place, and time. She has normal strength. No cranial nerve deficit or sensory deficit.    Skin: Skin is warm and dry. Capillary refill takes less than 2 seconds.   Psychiatric: She has a normal mood and affect.         ED Course   Procedures  Labs Reviewed   CBC W/ AUTO DIFFERENTIAL - Abnormal; Notable for the following components:       Result Value    Gran # (ANC) 8.8 (*)     All other components within normal limits   COMPREHENSIVE METABOLIC PANEL - Abnormal; Notable for the following components:    Sodium 135 (*)     Anion Gap 7 (*)     All other components within normal limits   URINALYSIS, REFLEX TO URINE CULTURE - Abnormal; Notable for the following components:    Urobilinogen, UA 2.0-3.0 (*)     All other components within normal limits    Narrative:     Specimen Source->Urine   URINALYSIS MICROSCOPIC    Narrative:     Specimen Source->Urine   HCG, QUANTITATIVE, PREGNANCY          Imaging Results          US OB <14 Wks TransAbd & TransVag, Single Gestation (XPD) (Final result)  Result time 07/21/20 20:29:26    Final result by Christa Sarkar MD (07/21/20 20:29:26)                 Impression:      Early live IUP with crown-rump length measurements corresponding to a gestational age of 7 weeks 3 days with an ultrasound due date of 03/06/2021.    Small perigestational bleed.    Small free fluid seen in the right adnexa adjacent to the ovary      Electronically signed by: Christa Sarkar  Date:    07/21/2020  Time:    20:29             Narrative:    EXAMINATION:  ULTRASOUND OBSTETRICAL ULTRASOUND LESS THAN 14 WEEKS WITH TRANSVAGINAL    CLINICAL HISTORY:  vaginal bleeding;    TECHNIQUE:  Real-time ultrasound obstetrical ultrasound less than 14 weeks was performed transabdominally and  transvaginally.    COMPARISON:  None.    FINDINGS:  The uterus measures 10.7 x 5.7 x 7.0 cm. There are no uterine masses. A fetal pole, yolk sac, and gestational sac are all visualized.  The fetal pole has a crown-rump length of 11.5 mm, which corresponds to a gestational age of 7 weeks 3 days.  The fetal heart  rate is 150 beats per minute.    There is a hypoechoic area adjacent to the gestational sac which measures 8 x 2 x 4 mm.    The right ovary measures 4.4 x 1.8 x 2.9 cm. The left ovary measures 3.4 x 1.7 x 2.3 cm.    Free fluid is seen in the cul-de-sac and adjacent to the right ovary.                                 Medical Decision Making:   Clinical Tests:   Lab Tests: Ordered and Reviewed  Radiological Study: Ordered and Reviewed  ED Management:  This is an evaluation of a 19 y.o. female A1 who presents to the ED for vaginal spotting, nausea and vomiting.     Patient is UPT positive. The [place]-hCG [place] 69[address].  H&H stable.  I doubt tubo-ovarian abscess, PID, cervicitis, or vaginitis.  The patient's blood type is O positive.    The patient does not require RhoGAM at this time.  Transvaginal ultrasound shows single IUP with estimated gestational age of 7 weeks 3 days with fetal heart rate of 150.  There is a small perigestational bleed.    Patient is diagnosed with threatened miscarriage and hyperemesis gravidarum.    Patient treated the ED with IV fluids and Phenergan.  Patient able tolerate p.o. in the ED without difficulty.  Will discharge patient home with Phenergan suppositories.    The results and physical exam findings were reviewed with the patient. Pt agrees with assessment, disposition and treatment plan and has no further questions or complaints at this time. The patient will need to follow up with her OB/GYN as soon as possible. I have given her strict return precautions. The patient should continue taking prenatal vitamins.  I discussed the need to have pelvic rest, avoiding heavy lifting and abstaining from sexual intercourse.              Scribe Attestation:   Scribe #1: I performed the above scribed service and the documentation accurately describes the services I performed. I attest to the accuracy of the note.                          Clinical Impression:       ICD-10-CM ICD-9-CM   1.  Hyperemesis gravidarum  O21.0 643.00   2. Vaginal bleeding during pregnancy  O46.90 641.93           Scribe Attestation: I, Leroy Gamble , personally performed the services described in this documentation. All medical record entries made by the scribe were at my direction and in my presence. I have reviewed the chart and agree that the record reflects my personal performance and is accurate and complete.   ED Disposition Condition    Discharge Stable        ED Prescriptions     Medication Sig Dispense Start Date End Date Auth. Provider    promethazine (PHENERGAN) 25 MG suppository Place 1 suppository (25 mg total) rectally every 6 (six) hours as needed for Nausea. 10 suppository 7/21/2020  Leroy Gamble, PAFabyC        Follow-up Information     Follow up With Specialties Details Why Contact Info    [facility] Schedule an appointment as soon as possible for a visit in 1 day For reevaluation [address] [place] [address] [id number] [phone]      [facility] Go in 1 day If symptoms worsen [address] [facility] [address] [phone]

## 2020-08-18 ENCOUNTER — INITIAL PRENATAL (OUTPATIENT)
Dept: OBSTETRICS AND GYNECOLOGY | Facility: CLINIC | Age: 20
End: 2020-08-18
Payer: MEDICAID

## 2020-08-18 VITALS
WEIGHT: 153.25 LBS | BODY MASS INDEX: 28.95 KG/M2 | SYSTOLIC BLOOD PRESSURE: 120 MMHG | DIASTOLIC BLOOD PRESSURE: 70 MMHG

## 2020-08-18 DIAGNOSIS — R11.2 NAUSEA AND VOMITING, INTRACTABILITY OF VOMITING NOT SPECIFIED, UNSPECIFIED VOMITING TYPE: Primary | ICD-10-CM

## 2020-08-18 DIAGNOSIS — Z34.81 ENCOUNTER FOR SUPERVISION OF OTHER NORMAL PREGNANCY IN FIRST TRIMESTER: ICD-10-CM

## 2020-08-18 DIAGNOSIS — Z82.79 FAMILY HISTORY OF BIRTH DEFECT: ICD-10-CM

## 2020-08-18 PROBLEM — A60.09 HERPES GENITALIS IN WOMEN: Status: RESOLVED | Noted: 2017-02-25 | Resolved: 2020-08-18

## 2020-08-18 PROBLEM — R50.9 FEVER OF UNKNOWN ORIGIN: Status: RESOLVED | Noted: 2018-06-13 | Resolved: 2020-08-18

## 2020-08-18 PROBLEM — Z34.90 SUPERVISION OF NORMAL PREGNANCY: Status: ACTIVE | Noted: 2020-08-18

## 2020-08-18 PROCEDURE — 99202 OFFICE O/P NEW SF 15 MIN: CPT | Mod: TH,S$PBB,, | Performed by: OBSTETRICS & GYNECOLOGY

## 2020-08-18 PROCEDURE — 99213 OFFICE O/P EST LOW 20 MIN: CPT | Mod: PBBFAC,TH,PO | Performed by: OBSTETRICS & GYNECOLOGY

## 2020-08-18 PROCEDURE — 99999 PR PBB SHADOW E&M-EST. PATIENT-LVL III: ICD-10-PCS | Mod: PBBFAC,,, | Performed by: OBSTETRICS & GYNECOLOGY

## 2020-08-18 PROCEDURE — 99202 PR OFFICE/OUTPT VISIT, NEW, LEVL II, 15-29 MIN: ICD-10-PCS | Mod: TH,S$PBB,, | Performed by: OBSTETRICS & GYNECOLOGY

## 2020-08-18 PROCEDURE — 99999 PR PBB SHADOW E&M-EST. PATIENT-LVL III: CPT | Mod: PBBFAC,,, | Performed by: OBSTETRICS & GYNECOLOGY

## 2020-08-18 RX ORDER — ONDANSETRON 4 MG/1
4 TABLET, FILM COATED ORAL EVERY 8 HOURS PRN
Qty: 60 TABLET | Refills: 5 | Status: ON HOLD | OUTPATIENT
Start: 2020-08-18 | End: 2021-03-01 | Stop reason: HOSPADM

## 2020-08-18 NOTE — PROGRESS NOTES
Jolly Collier is a 19 y.o. [id number] at 11w3d presents for routine prenatal visit. Patient previously seen on [facility] for initial prenatal visit. All workup there was normal.   Patient denies contractions, denies vaginal bleeding, denies LOF.   Fetal Movement: not yet present.   She does acknowledge nausea and vomiting. Says she throws up most days and is losing weight. She has tried Promethazine for the nausea, but stopped taking it as it makes her very drowsy.     /70   Wt 69.5 kg (153 lb 3.5 oz)   LMP 2020   BMI 28.95 kg/m²     19 y.o., at 11w3d by Estimated Date of Delivery: 3/6/21  Patient Active Problem List   Diagnosis    Supervision of normal pregnancy     OB History    Para Term  AB Living   3 1 1 0 1 1   SAB TAB Ectopic Multiple Live Births   0 1 0 0 1      # Outcome Date GA Lbr Bean/2nd Weight Sex Delivery Anes PTL Lv   3 Current            2 TAB 2020           1 Term 18 37w0d  3.487 kg (7 lb 11 oz) F Vag-Spont EPI  LUKASZ       Dating reviewed    Allergies and problem list reviewed and updated    Medical and surgical history reviewed    Prenatal labs reviewed and updated    Physical Exam:  ABD: soft, gravid, nontender  FHT obtained    Jolly MARSH was seen today for routine prenatal visit.    Diagnoses and all orders for this visit:    Nausea and vomiting, intractability of vomiting not specified, unspecified vomiting type  -     ondansetron (ZOFRAN) 4 MG tablet; Take 1 tablet (4 mg total) by mouth every 8 (eight) hours as needed for Nausea.    Encounter for supervision of other normal pregnancy in first trimester  -     US MFM Procedure ([facility]point); Future    Family history of birth defect  -     Ambulatory referral/consult to Perinatology; Future    Other orders  -     Connected MOM Enrollment  -     Assign Connected MOM Program Consent Questionnaire          Assessment/Plan:  1. IUP @ 11 weeks  - Desires genetic testing. NT ordered.  - Profound nausea/vomiting.  5lb weight loss since pregnancy started. Will order Zofran to pharmacy.   - Previous child affected by Vein of Sharif anomaly. Will consult MFM.     Follow up 4 Weeks, labor precautions      Karla Bowman M.D.  JOSELINE PGY2

## 2020-08-24 ENCOUNTER — LAB VISIT (OUTPATIENT)
Dept: LAB | Facility: OTHER | Age: 20
End: 2020-08-24
Attending: OBSTETRICS & GYNECOLOGY
Payer: MEDICAID

## 2020-08-24 ENCOUNTER — PROCEDURE VISIT (OUTPATIENT)
Dept: MATERNAL FETAL MEDICINE | Facility: CLINIC | Age: 20
End: 2020-08-24
Payer: MEDICAID

## 2020-08-24 ENCOUNTER — INITIAL CONSULT (OUTPATIENT)
Dept: MATERNAL FETAL MEDICINE | Facility: CLINIC | Age: 20
End: 2020-08-24
Attending: OBSTETRICS & GYNECOLOGY
Payer: MEDICAID

## 2020-08-24 VITALS
BODY MASS INDEX: 29.12 KG/M2 | DIASTOLIC BLOOD PRESSURE: 64 MMHG | WEIGHT: 154.13 LBS | SYSTOLIC BLOOD PRESSURE: 108 MMHG

## 2020-08-24 DIAGNOSIS — Z36.82 ENCOUNTER FOR ANTENATAL SCREENING FOR NUCHAL TRANSLUCENCY: ICD-10-CM

## 2020-08-24 DIAGNOSIS — Z87.798 HISTORY OF CONGENITAL ABNORMALITY: ICD-10-CM

## 2020-08-24 DIAGNOSIS — Z36.82 ENCOUNTER FOR ANTENATAL SCREENING FOR NUCHAL TRANSLUCENCY: Primary | ICD-10-CM

## 2020-08-24 DIAGNOSIS — Z36.89 ENCOUNTER FOR FETAL ANATOMIC SURVEY: ICD-10-CM

## 2020-08-24 DIAGNOSIS — Z82.79 FAMILY HISTORY OF BIRTH DEFECT: ICD-10-CM

## 2020-08-24 DIAGNOSIS — Z34.81 ENCOUNTER FOR SUPERVISION OF OTHER NORMAL PREGNANCY IN FIRST TRIMESTER: ICD-10-CM

## 2020-08-24 PROCEDURE — 76813 PR US, OB NUCHAL, TRANSABDOM/TRANSVAG, FIRST GESTATION: ICD-10-PCS | Mod: 26,S$PBB,, | Performed by: OBSTETRICS & GYNECOLOGY

## 2020-08-24 PROCEDURE — 76801 PR US, OB <14WKS, TRANSABD, SINGLE GESTATION: ICD-10-PCS | Mod: 26,S$PBB,, | Performed by: OBSTETRICS & GYNECOLOGY

## 2020-08-24 PROCEDURE — 99213 OFFICE O/P EST LOW 20 MIN: CPT | Mod: PBBFAC,TH | Performed by: OBSTETRICS & GYNECOLOGY

## 2020-08-24 PROCEDURE — 76801 OB US < 14 WKS SINGLE FETUS: CPT | Mod: 26,S$PBB,, | Performed by: OBSTETRICS & GYNECOLOGY

## 2020-08-24 PROCEDURE — 81508 FTL CGEN ABNOR TWO PROTEINS: CPT

## 2020-08-24 PROCEDURE — 99999 PR PBB SHADOW E&M-EST. PATIENT-LVL III: CPT | Mod: PBBFAC,,, | Performed by: OBSTETRICS & GYNECOLOGY

## 2020-08-24 PROCEDURE — 36415 COLL VENOUS BLD VENIPUNCTURE: CPT

## 2020-08-24 PROCEDURE — 99202 PR OFFICE/OUTPT VISIT, NEW, LEVL II, 15-29 MIN: ICD-10-PCS | Mod: S$PBB,TH,25, | Performed by: OBSTETRICS & GYNECOLOGY

## 2020-08-24 PROCEDURE — 99202 OFFICE O/P NEW SF 15 MIN: CPT | Mod: S$PBB,TH,25, | Performed by: OBSTETRICS & GYNECOLOGY

## 2020-08-24 PROCEDURE — 76801 OB US < 14 WKS SINGLE FETUS: CPT | Mod: PBBFAC | Performed by: OBSTETRICS & GYNECOLOGY

## 2020-08-24 PROCEDURE — 99999 PR PBB SHADOW E&M-EST. PATIENT-LVL III: ICD-10-PCS | Mod: PBBFAC,,, | Performed by: OBSTETRICS & GYNECOLOGY

## 2020-08-24 PROCEDURE — 76813 OB US NUCHAL MEAS 1 GEST: CPT | Mod: PBBFAC | Performed by: OBSTETRICS & GYNECOLOGY

## 2020-08-24 PROCEDURE — 76813 OB US NUCHAL MEAS 1 GEST: CPT | Mod: 26,S$PBB,, | Performed by: OBSTETRICS & GYNECOLOGY

## 2020-08-24 NOTE — PROGRESS NOTES
Maternal Fetal Medicine consult    SUBJECTIVE:     Gonzalo Collier is a 19 y.o.  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 Had coils placed at 1 week of age and replaced at 6 months of age. Child doing well now.  G2 2020 TAB  G3 current    Past Medical History:   Diagnosis Date    Herpes      Past Surgical History:   Procedure Laterality Date    lasix       Family history: Besides above, otherwise negative for birth defects, recurrent miscarriages, chromosomal abnormalities.   Social History     Tobacco Use    Smoking status: Never Smoker    Smokeless tobacco: Never Used   Substance Use Topics    Alcohol use: No    Drug use: No     Review of patient's allergies indicates:   Allergen Reactions    Amoxicillin     Penicillins      Medications:  PNV    Aneuploidy screening:   Desires sequential screen    Records reviewed    Care team members:  White - Primary OB     OBJECTIVE:     Blood Pressure: 108/64  Weight: 69kg    Ultrasound performed. See viewpoint for full ultrasound report.  Fetal size is consistent with established dating, and normal fetal heart motion is seen. The nuchal translucency is measured, and a sample of maternal blood will be sent today for the first portion of the sequential screen.       ASSESSMENT/PLAN:     19 y.o.  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 It is not believed to have a genetic or hereditary component, therefore her risk of having another child with vein of sofia is not increased compared to that of the general population. I informed her that today's ultrasound findings are  normal.    We will see the patient back for a target anatomy survey in 7 weeks. Please call MFM with any questions.  1st portion of sequential screen ordered today.    Time spent in consultation today: 15 minutes, >50% of which was face-to-face time with the patient.    Jacinta Marroquin MD  Maternal Fetal Medicine fellow  PGY-6

## 2020-08-24 NOTE — LETTER
August 24, 2020      Debbie Mi MD  [address]  [address]  [facility] [address]           [facility] [facility] [address] [address]  [address]  [facility] [address]  Phone: [phone]          Patient: Jolly Collier   MR Number: [id number]   YOB: 2000   Date of Visit: 8/24/2020       Dear Dr. Debbie Mi:    Thank you for referring Jolly Collier to me for evaluation. Attached you will find relevant portions of my assessment and plan of care.    If you have questions, please do not hesitate to call me. I look forward to following Jolly Collier along with you.    Sincerely,    Crow Mccoy MD    Enclosure  CC:  No Recipients    If you would like to receive this communication electronically, please contact [email or web address] or [phone] to request more information on [email or web address] Ã¨â€¹Â±[id number]€[id number]€˜ Link access.    For providers and/or their staff who would like to refer a patient to [facility], please contact us through our one-stop-shop provider referral line, [facility], at [phone].    If you feel you have received this communication in error or would no longer like to receive these types of communications, please e-mail [email or web address]

## 2020-08-27 LAB
# FETUSES US: NORMAL
AGE AT DELIVERY: 21
B-HCG MOM SERPL: NORMAL
B-HCG SERPL-ACNC: 72.8 IU/ML
FET CRL US.MEAS: 62.9 MM
FET NASAL BONE LENGTH US.MEAS: NORMAL MM
FET NUCHAL FOLD MOM THICKNESS US.MEAS: NORMAL
FET NUCHAL FOLD THICKNESS US.MEAS: 1.6 MM
FET TS 21 RISK FROM MAT AGE: NORMAL
GA (DAYS): 5 D
GA (WEEKS): 12 WK
IDDM PATIENT QL: NORMAL
INTEGRATED SCN PATIENT-IMP NAR: NORMAL
INTEGRATED SCN PATIENT-IMP: NEGATIVE
PAPP-A MOM SERPL: NORMAL
PAPP-A SERPL-MCNC: 659 NG/ML
SMOKING STATUS FTND: NO
TS 18 RISK FETUS: NORMAL
TS 21 RISK FETUS: NORMAL
US DATE: NORMAL

## 2020-09-15 ENCOUNTER — ROUTINE PRENATAL (OUTPATIENT)
Dept: OBSTETRICS AND GYNECOLOGY | Facility: CLINIC | Age: 20
End: 2020-09-15
Payer: MEDICAID

## 2020-09-15 ENCOUNTER — APPOINTMENT (OUTPATIENT)
Dept: LAB | Facility: HOSPITAL | Age: 20
End: 2020-09-15
Attending: OBSTETRICS & GYNECOLOGY
Payer: MEDICAID

## 2020-09-15 VITALS
WEIGHT: 157.63 LBS | SYSTOLIC BLOOD PRESSURE: 112 MMHG | BODY MASS INDEX: 29.78 KG/M2 | DIASTOLIC BLOOD PRESSURE: 72 MMHG

## 2020-09-15 DIAGNOSIS — Z34.82 ENCOUNTER FOR SUPERVISION OF OTHER NORMAL PREGNANCY IN SECOND TRIMESTER: Primary | ICD-10-CM

## 2020-09-15 PROCEDURE — 99213 PR OFFICE/OUTPT VISIT, EST, LEVL III, 20-29 MIN: ICD-10-PCS | Mod: TH,S$PBB,, | Performed by: OBSTETRICS & GYNECOLOGY

## 2020-09-15 PROCEDURE — 81511 FTL CGEN ABNOR FOUR ANAL: CPT

## 2020-09-15 PROCEDURE — 99213 OFFICE O/P EST LOW 20 MIN: CPT | Mod: TH,S$PBB,, | Performed by: OBSTETRICS & GYNECOLOGY

## 2020-09-15 PROCEDURE — 99999 PR PBB SHADOW E&M-EST. PATIENT-LVL III: ICD-10-PCS | Mod: PBBFAC,,, | Performed by: OBSTETRICS & GYNECOLOGY

## 2020-09-15 PROCEDURE — 99213 OFFICE O/P EST LOW 20 MIN: CPT | Mod: PBBFAC,TH,PO | Performed by: OBSTETRICS & GYNECOLOGY

## 2020-09-15 PROCEDURE — 99999 PR PBB SHADOW E&M-EST. PATIENT-LVL III: CPT | Mod: PBBFAC,,, | Performed by: OBSTETRICS & GYNECOLOGY

## 2020-09-15 RX ORDER — PRENATAL VIT 27,CALC/IRON/FA 60 MG-1 MG
TABLET ORAL
Status: ON HOLD | COMMUNITY
Start: 2020-09-09 | End: 2021-03-01 | Stop reason: HOSPADM

## 2020-09-15 NOTE — PROGRESS NOTES
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    Diagnoses and all orders for this visit:    Encounter for supervision of other normal pregnancy in second trimester  -     Maternal Sequential Screen Part 2         Plan:   follow up labs.  Recommend flu shot   follow up 4 Weeks, bleeding/pain precautions

## 2020-09-18 LAB
# FETUSES US: NORMAL
AFP MOM SERPL: 0.64
AFP SERPL-MCNC: 19.4 NG/ML
AGE AT DELIVERY: 21
B-HCG MOM SERPL: 0.54
B-HCG SERPL-ACNC: 20.3 IU/ML
COLLECT DATE BLD: NORMAL
COLLECT DATE: NORMAL
FET NASAL BONE LENGTH US.MEAS: NORMAL MM
FET NUCHAL FOLD MOM THICKNESS US.MEAS: 0.84
FET NUCHAL FOLD THICKNESS US.MEAS: 1.6 MM
FET TS 21 RISK FROM MAT AGE: NORMAL
GA (DAYS): 5 D
GA (WEEKS): 12 WK
GA METHOD: NORMAL
GEST. AGE (DAYS) 2ND SAMPLE (SS2): 6
GEST. AGE (WKS) 2ND SAMPLE (SS2): 15
IDDM PATIENT QL: NORMAL
INHIBIN A MOM SERPL: 0.52
INHIBIN A SERPL-MCNC: 77.5 PG/ML
INTEGRATED SCN PATIENT-IMP: NEGATIVE
PAPP-A MOM SERPL: 0.64
PAPP-A SERPL-MCNC: 659 NG/ML
SEQUENTIAL SCREEN PART 2 INTERP: NORMAL
SMOKING STATUS FTND: NO
TS 18 RISK FETUS: NORMAL
TS 21 RISK FETUS: NORMAL
U ESTRIOL MOM SERPL: 1.02
U ESTRIOL SERPL-MCNC: 0.9 NG/ML

## 2020-10-12 ENCOUNTER — PROCEDURE VISIT (OUTPATIENT)
Dept: MATERNAL FETAL MEDICINE | Facility: CLINIC | Age: 20
End: 2020-10-12
Attending: OBSTETRICS & GYNECOLOGY
Payer: MEDICAID

## 2020-10-12 DIAGNOSIS — Z36.89 ENCOUNTER FOR FETAL ANATOMIC SURVEY: ICD-10-CM

## 2020-10-12 PROCEDURE — 76811 PR US, OB FETAL EVAL & EXAM, TRANSABDOM,FIRST GESTATION: ICD-10-PCS | Mod: 26,S$PBB,, | Performed by: OBSTETRICS & GYNECOLOGY

## 2020-10-12 PROCEDURE — 76811 OB US DETAILED SNGL FETUS: CPT | Mod: PBBFAC | Performed by: OBSTETRICS & GYNECOLOGY

## 2020-10-12 PROCEDURE — 76811 OB US DETAILED SNGL FETUS: CPT | Mod: 26,S$PBB,, | Performed by: OBSTETRICS & GYNECOLOGY

## 2020-10-13 ENCOUNTER — ROUTINE PRENATAL (OUTPATIENT)
Dept: OBSTETRICS AND GYNECOLOGY | Facility: CLINIC | Age: 20
End: 2020-10-13
Payer: MEDICAID

## 2020-10-13 VITALS
BODY MASS INDEX: 31.03 KG/M2 | DIASTOLIC BLOOD PRESSURE: 80 MMHG | WEIGHT: 164.25 LBS | SYSTOLIC BLOOD PRESSURE: 104 MMHG

## 2020-10-13 DIAGNOSIS — N89.8 VAGINAL ODOR: ICD-10-CM

## 2020-10-13 DIAGNOSIS — Z34.82 ENCOUNTER FOR SUPERVISION OF OTHER NORMAL PREGNANCY IN SECOND TRIMESTER: Primary | ICD-10-CM

## 2020-10-13 PROCEDURE — 99999 PR PBB SHADOW E&M-EST. PATIENT-LVL III: ICD-10-PCS | Mod: PBBFAC,,, | Performed by: ADVANCED PRACTICE MIDWIFE

## 2020-10-13 PROCEDURE — 90471 IMMUNIZATION ADMIN: CPT | Mod: PBBFAC,PO

## 2020-10-13 PROCEDURE — 99213 PR OFFICE/OUTPT VISIT, EST, LEVL III, 20-29 MIN: ICD-10-PCS | Mod: TH,S$PBB,, | Performed by: ADVANCED PRACTICE MIDWIFE

## 2020-10-13 PROCEDURE — 99213 OFFICE O/P EST LOW 20 MIN: CPT | Mod: PBBFAC,TH,PO,25 | Performed by: ADVANCED PRACTICE MIDWIFE

## 2020-10-13 PROCEDURE — 99999 PR PBB SHADOW E&M-EST. PATIENT-LVL III: CPT | Mod: PBBFAC,,, | Performed by: ADVANCED PRACTICE MIDWIFE

## 2020-10-13 PROCEDURE — 99213 OFFICE O/P EST LOW 20 MIN: CPT | Mod: TH,S$PBB,, | Performed by: ADVANCED PRACTICE MIDWIFE

## 2020-10-13 NOTE — PROGRESS NOTES
Reason for visit: Routine Prenatal Visit and Vaginal Discharge (with odor)    HPI:   20 y.o., at 19w3d by Estimated Date of Delivery: 3/6/21    In with report of vaginal odor    ROS:  OBSTETRICS  - Contractions: denies  - Bleeding: denies  - Loss of fluid: denies  - Fetal movement: noen  GASTRO  - Nausea: none  - Vomiting: none  NEURO  - Headache: none      Past medical, surgical, social, family, and OB history: Reviewed and updated in EMR.  Medications: Reviewed and updated in EMR.  Allergies: Amoxicillin and Penicillins   Pregnancy dating, labs, ultrasound reports, prenatal testing, and problem list: Reviewed and updated in EMR.    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 2020 by Crow Mccoy MD No    Supervision of normal pregnancy 2020 by Debbie Mi MD No            Vitals: /80   Wt 74.5 kg (164 lb 3.9 oz)   LMP 2020   BMI 31.03 kg/m²     Physical exam:  GENERAL: No acute distress, normal appearance  NECK: Supple, normal ROM  SKIN: No rashes  NEURO: Alert and oriented x3  PSYCH: Normal mood and affect  CARDIO: Trace bilateral LE edema  PULMONARY: Normal respiratory effort; no respiratory distress  ABD: Soft, gravid, nontender; no hernia or hepatosplenomegaly    Assessment and Plan:    Encounter for supervision of other normal pregnancy in second trimester    Vaginal odor  -     Vaginosis Screen by DNA Probe  -     Vaginosis Screen by DNA Probe    Other orders  -     Influenza - Quadrivalent (PF)          Flu vaccine today  Discussed TDAP p 28 weeks  Discussed DM screen 24-28 weeks   labor precautions given  Follow-up: 4 weeks

## 2020-10-15 LAB
CANDIDA RRNA VAG QL PROBE: NEGATIVE
G VAGINALIS RRNA GENITAL QL PROBE: POSITIVE
T VAGINALIS RRNA GENITAL QL PROBE: NEGATIVE

## 2020-10-16 ENCOUNTER — PATIENT MESSAGE (OUTPATIENT)
Dept: OBSTETRICS AND GYNECOLOGY | Facility: CLINIC | Age: 20
End: 2020-10-16

## 2020-10-16 DIAGNOSIS — B96.89 BV (BACTERIAL VAGINOSIS): Primary | ICD-10-CM

## 2020-10-16 DIAGNOSIS — N76.0 BV (BACTERIAL VAGINOSIS): Primary | ICD-10-CM

## 2020-10-16 RX ORDER — METRONIDAZOLE 500 MG/1
500 TABLET ORAL EVERY 12 HOURS
Qty: 14 TABLET | Refills: 0 | Status: SHIPPED | OUTPATIENT
Start: 2020-10-16 | End: 2020-10-23

## 2020-11-09 ENCOUNTER — PROCEDURE VISIT (OUTPATIENT)
Dept: MATERNAL FETAL MEDICINE | Facility: CLINIC | Age: 20
End: 2020-11-09
Payer: MEDICAID

## 2020-11-09 PROCEDURE — 76816 OB US FOLLOW-UP PER FETUS: CPT | Mod: 26,S$PBB,, | Performed by: OBSTETRICS & GYNECOLOGY

## 2020-11-09 PROCEDURE — 76816 PR  US,PREGNANT UTERUS,F/U,TRANSABD APP: ICD-10-PCS | Mod: 26,S$PBB,, | Performed by: OBSTETRICS & GYNECOLOGY

## 2020-11-09 PROCEDURE — 76816 OB US FOLLOW-UP PER FETUS: CPT | Mod: PBBFAC | Performed by: OBSTETRICS & GYNECOLOGY

## 2020-11-17 ENCOUNTER — ROUTINE PRENATAL (OUTPATIENT)
Dept: OBSTETRICS AND GYNECOLOGY | Facility: CLINIC | Age: 20
End: 2020-11-17
Payer: MEDICAID

## 2020-11-17 VITALS
WEIGHT: 174.63 LBS | DIASTOLIC BLOOD PRESSURE: 60 MMHG | SYSTOLIC BLOOD PRESSURE: 114 MMHG | BODY MASS INDEX: 32.99 KG/M2

## 2020-11-17 DIAGNOSIS — Z87.798 HISTORY OF CONGENITAL ABNORMALITY: ICD-10-CM

## 2020-11-17 DIAGNOSIS — Z34.82 ENCOUNTER FOR SUPERVISION OF OTHER NORMAL PREGNANCY IN SECOND TRIMESTER: ICD-10-CM

## 2020-11-17 PROCEDURE — 99213 OFFICE O/P EST LOW 20 MIN: CPT | Mod: TH,S$PBB,, | Performed by: ADVANCED PRACTICE MIDWIFE

## 2020-11-17 PROCEDURE — 99212 OFFICE O/P EST SF 10 MIN: CPT | Mod: PBBFAC,TH,PN | Performed by: ADVANCED PRACTICE MIDWIFE

## 2020-11-17 PROCEDURE — 99999 PR PBB SHADOW E&M-EST. PATIENT-LVL II: CPT | Mod: PBBFAC,,, | Performed by: ADVANCED PRACTICE MIDWIFE

## 2020-11-17 PROCEDURE — 99213 PR OFFICE/OUTPT VISIT, EST, LEVL III, 20-29 MIN: ICD-10-PCS | Mod: TH,S$PBB,, | Performed by: ADVANCED PRACTICE MIDWIFE

## 2020-11-17 PROCEDURE — 99999 PR PBB SHADOW E&M-EST. PATIENT-LVL II: ICD-10-PCS | Mod: PBBFAC,,, | Performed by: ADVANCED PRACTICE MIDWIFE

## 2020-11-27 NOTE — PROGRESS NOTES
Reason for visit: Routine Prenatal Visit    HPI:   20 y.o., at 25w6d by Estimated Date of Delivery: 3/6/21    Reports episodic lightheadedness    ROS:  OBSTETRICS  - Contractions: denies  - Bleeding: denies  - Loss of fluid: denies  - Fetal movement: reports mvmt  GASTRO  - Nausea: denies  - Vomiting: denies  NEURO  - Headache: denies      Past medical, surgical, social, family, and OB history: Reviewed and updated in EMR.  Medications: Reviewed and updated in EMR.  Allergies: Amoxicillin and Penicillins   Pregnancy dating, labs, ultrasound reports, prenatal testing, and problem list: Reviewed and updated in EMR.    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 2020 by Crow Mccoy MD No    Supervision of normal pregnancy 2020 by Debbie Mi MD No            Vitals: /60   Wt 79.2 kg (174 lb 9.7 oz)   LMP 2020   BMI 32.99 kg/m²     Physical exam:  GENERAL: No acute distress, normal appearance  NECK: Supple, normal ROM  SKIN: No rashes  NEURO: Alert and oriented x3  PSYCH: Normal mood and affect  CARDIO: Trace bilateral LE edema  PULMONARY: Normal respiratory effort; no respiratory distress  ABD: Soft, gravid, nontender; no hernia or hepatosplenomegaly    Assessment and Plan:    History of congenital abnormality    Encounter for supervision of other normal pregnancy in second trimester      Discussed glucose changes in pregnancy and need for regular meals and snacks to avoid a drop in glucose.     labor precautions given  Follow-up: 4 weeks

## 2020-12-08 ENCOUNTER — PROCEDURE VISIT (OUTPATIENT)
Dept: OBSTETRICS AND GYNECOLOGY | Facility: CLINIC | Age: 20
End: 2020-12-08
Payer: MEDICAID

## 2020-12-08 ENCOUNTER — ROUTINE PRENATAL (OUTPATIENT)
Dept: OBSTETRICS AND GYNECOLOGY | Facility: CLINIC | Age: 20
End: 2020-12-08
Payer: MEDICAID

## 2020-12-08 VITALS — DIASTOLIC BLOOD PRESSURE: 64 MMHG | SYSTOLIC BLOOD PRESSURE: 108 MMHG | WEIGHT: 184.5 LBS | BODY MASS INDEX: 34.87 KG/M2

## 2020-12-08 DIAGNOSIS — Z3A.27 27 WEEKS GESTATION OF PREGNANCY: ICD-10-CM

## 2020-12-08 DIAGNOSIS — Z87.798 HISTORY OF CONGENITAL ABNORMALITY: ICD-10-CM

## 2020-12-08 DIAGNOSIS — Z34.82 ENCOUNTER FOR SUPERVISION OF OTHER NORMAL PREGNANCY IN SECOND TRIMESTER: Primary | ICD-10-CM

## 2020-12-08 LAB
BASOPHILS # BLD AUTO: 0.05 K/UL (ref 0–0.2)
BASOPHILS NFR BLD: 0.3 % (ref 0–1.9)
DIFFERENTIAL METHOD: ABNORMAL
EOSINOPHIL # BLD AUTO: 0.3 K/UL (ref 0–0.5)
EOSINOPHIL NFR BLD: 1.4 % (ref 0–8)
ERYTHROCYTE [DISTWIDTH] IN BLOOD BY AUTOMATED COUNT: 13.3 % (ref 11.5–14.5)
GLUCOSE SERPL-MCNC: 106 MG/DL (ref 70–140)
HCT VFR BLD AUTO: 40.5 % (ref 37–48.5)
HGB BLD-MCNC: 12.8 G/DL (ref 12–16)
IMM GRANULOCYTES # BLD AUTO: 0.14 K/UL (ref 0–0.04)
IMM GRANULOCYTES NFR BLD AUTO: 0.8 % (ref 0–0.5)
LYMPHOCYTES # BLD AUTO: 2.3 K/UL (ref 1–4.8)
LYMPHOCYTES NFR BLD: 12.7 % (ref 18–48)
MCH RBC QN AUTO: 29.5 PG (ref 27–31)
MCHC RBC AUTO-ENTMCNC: 31.6 G/DL (ref 32–36)
MCV RBC AUTO: 93 FL (ref 82–98)
MONOCYTES # BLD AUTO: 0.7 K/UL (ref 0.3–1)
MONOCYTES NFR BLD: 3.7 % (ref 4–15)
NEUTROPHILS # BLD AUTO: 14.9 K/UL (ref 1.8–7.7)
NEUTROPHILS NFR BLD: 81.1 % (ref 38–73)
NRBC BLD-RTO: 0 /100 WBC
PLATELET # BLD AUTO: 202 K/UL (ref 150–350)
PMV BLD AUTO: 12.6 FL (ref 9.2–12.9)
RBC # BLD AUTO: 4.34 M/UL (ref 4–5.4)
WBC # BLD AUTO: 18.39 K/UL (ref 3.9–12.7)

## 2020-12-08 PROCEDURE — 99999 PR PBB SHADOW E&M-EST. PATIENT-LVL II: CPT | Mod: PBBFAC,,, | Performed by: ADVANCED PRACTICE MIDWIFE

## 2020-12-08 PROCEDURE — 85025 COMPLETE CBC W/AUTO DIFF WBC: CPT

## 2020-12-08 PROCEDURE — 99212 OFFICE O/P EST SF 10 MIN: CPT | Mod: PBBFAC,TH,PN | Performed by: ADVANCED PRACTICE MIDWIFE

## 2020-12-08 PROCEDURE — 99213 OFFICE O/P EST LOW 20 MIN: CPT | Mod: TH,S$PBB,, | Performed by: ADVANCED PRACTICE MIDWIFE

## 2020-12-08 PROCEDURE — 99213 PR OFFICE/OUTPT VISIT, EST, LEVL III, 20-29 MIN: ICD-10-PCS | Mod: TH,S$PBB,, | Performed by: ADVANCED PRACTICE MIDWIFE

## 2020-12-08 PROCEDURE — 82950 GLUCOSE TEST: CPT

## 2020-12-08 PROCEDURE — 99999 PR PBB SHADOW E&M-EST. PATIENT-LVL II: ICD-10-PCS | Mod: PBBFAC,,, | Performed by: ADVANCED PRACTICE MIDWIFE

## 2020-12-08 NOTE — PROGRESS NOTES
Lab Documentation:    Order Type: Written Order placed in [facility]    Patient in for lab visit only per provider treatment plan.

## 2020-12-08 NOTE — PROGRESS NOTES
Reason for visit: Routine Prenatal Visit    HPI:   20 y.o., at 27w3d by Estimated Date of Delivery: 3/6/21    In with no c/o    ROS:  OBSTETRICS  - Contractions: denies  - Bleeding: denies  - Loss of fluid: denies  - Fetal movement: reports good FM, reinforced BId  GASTRO  - Nausea: none  - Vomiting: noen  NEURO  - Headache: none      Past medical, surgical, social, family, and OB history: Reviewed and updated in EMR.  Medications: Reviewed and updated in EMR.  Allergies: Amoxicillin and Penicillins   Pregnancy dating, labs, ultrasound reports, prenatal testing, and problem list: Reviewed and updated in EMR.    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 2020 by Crow Mccoy MD No    Supervision of normal pregnancy 2020 by Debbie Mi MD No            Vitals: /64   Wt 83.7 kg (184 lb 8.4 oz)   LMP 2020   BMI 34.87 kg/m²     Physical exam:  GENERAL: No acute distress, normal appearance  NECK: Supple, normal ROM  SKIN: No rashes  NEURO: Alert and oriented x3  PSYCH: Normal mood and affect  CARDIO: Trace bilateral LE edema  PULMONARY: Normal respiratory effort; no respiratory distress  ABD: Soft, gravid, nontender; no hernia or hepatosplenomegaly    Assessment and Plan:    Encounter for supervision of other normal pregnancy in second trimester    History of congenital abnormality    27 weeks gestation of pregnancy  -     CBC Auto Differential; Future; Expected date: 2020  -     OB Glucose Screen; Future; Expected date: 2020        DM screen done today     labor precautions given  Follow-up: 2 weeks

## 2020-12-23 ENCOUNTER — CLINICAL SUPPORT (OUTPATIENT)
Dept: OBSTETRICS AND GYNECOLOGY | Facility: CLINIC | Age: 20
End: 2020-12-23
Payer: MEDICAID

## 2020-12-23 ENCOUNTER — ROUTINE PRENATAL (OUTPATIENT)
Dept: OBSTETRICS AND GYNECOLOGY | Facility: CLINIC | Age: 20
End: 2020-12-23
Payer: MEDICAID

## 2020-12-23 VITALS
SYSTOLIC BLOOD PRESSURE: 100 MMHG | WEIGHT: 189.63 LBS | BODY MASS INDEX: 35.82 KG/M2 | DIASTOLIC BLOOD PRESSURE: 80 MMHG

## 2020-12-23 DIAGNOSIS — Z87.798 HISTORY OF CONGENITAL ABNORMALITY: ICD-10-CM

## 2020-12-23 DIAGNOSIS — Z34.83 ENCOUNTER FOR SUPERVISION OF OTHER NORMAL PREGNANCY IN THIRD TRIMESTER: Primary | ICD-10-CM

## 2020-12-23 DIAGNOSIS — Z23 NEED FOR TDAP VACCINATION: Primary | ICD-10-CM

## 2020-12-23 PROCEDURE — 99213 PR OFFICE/OUTPT VISIT, EST, LEVL III, 20-29 MIN: ICD-10-PCS | Mod: TH,S$PBB,, | Performed by: ADVANCED PRACTICE MIDWIFE

## 2020-12-23 PROCEDURE — 99212 OFFICE O/P EST SF 10 MIN: CPT | Mod: PBBFAC,TH,PN | Performed by: ADVANCED PRACTICE MIDWIFE

## 2020-12-23 PROCEDURE — 99999 PR PBB SHADOW E&M-EST. PATIENT-LVL II: CPT | Mod: PBBFAC,,, | Performed by: ADVANCED PRACTICE MIDWIFE

## 2020-12-23 PROCEDURE — 99999 PR PBB SHADOW E&M-EST. PATIENT-LVL II: ICD-10-PCS | Mod: PBBFAC,,, | Performed by: ADVANCED PRACTICE MIDWIFE

## 2020-12-23 PROCEDURE — 99213 OFFICE O/P EST LOW 20 MIN: CPT | Mod: TH,S$PBB,, | Performed by: ADVANCED PRACTICE MIDWIFE

## 2020-12-23 PROCEDURE — 90471 IMMUNIZATION ADMIN: CPT | Mod: PBBFAC,PN

## 2020-12-23 NOTE — PROGRESS NOTES
MEDICATION DOCUMENTATION    Date: 12/23/2020          Time:  1:57 pm       DEPARTMENT: OB/GYN    ORDERING PHYSICIAN: Ami Polanco CNM    Order Type: Written order    MEDICATION: TDAP       SITE/ROUTE:   Right Deltoid    LOT#: [id number]    : [employer]     Expiration Date: 09/17/2022    Requested patient to remain 15 minutes.    PRE PAIN SCALE: None    POST PAIN SCALE: None

## 2021-01-06 ENCOUNTER — ROUTINE PRENATAL (OUTPATIENT)
Dept: OBSTETRICS AND GYNECOLOGY | Facility: CLINIC | Age: 21
End: 2021-01-06
Payer: MEDICAID

## 2021-01-06 VITALS
SYSTOLIC BLOOD PRESSURE: 110 MMHG | DIASTOLIC BLOOD PRESSURE: 80 MMHG | WEIGHT: 190.06 LBS | BODY MASS INDEX: 35.91 KG/M2

## 2021-01-06 DIAGNOSIS — Z87.798 HISTORY OF CONGENITAL ABNORMALITY: ICD-10-CM

## 2021-01-06 PROCEDURE — 99213 PR OFFICE/OUTPT VISIT, EST, LEVL III, 20-29 MIN: ICD-10-PCS | Mod: TH,S$PBB,, | Performed by: ADVANCED PRACTICE MIDWIFE

## 2021-01-06 PROCEDURE — 99999 PR PBB SHADOW E&M-EST. PATIENT-LVL II: ICD-10-PCS | Mod: PBBFAC,,, | Performed by: ADVANCED PRACTICE MIDWIFE

## 2021-01-06 PROCEDURE — 99999 PR PBB SHADOW E&M-EST. PATIENT-LVL II: CPT | Mod: PBBFAC,,, | Performed by: ADVANCED PRACTICE MIDWIFE

## 2021-01-06 PROCEDURE — 99213 OFFICE O/P EST LOW 20 MIN: CPT | Mod: TH,S$PBB,, | Performed by: ADVANCED PRACTICE MIDWIFE

## 2021-01-06 PROCEDURE — 99212 OFFICE O/P EST SF 10 MIN: CPT | Mod: PBBFAC,PN | Performed by: ADVANCED PRACTICE MIDWIFE

## 2021-01-08 ENCOUNTER — PATIENT MESSAGE (OUTPATIENT)
Dept: OBSTETRICS AND GYNECOLOGY | Facility: CLINIC | Age: 21
End: 2021-01-08

## 2021-01-08 RX ORDER — NITROFURANTOIN 25; 75 MG/1; MG/1
100 CAPSULE ORAL 2 TIMES DAILY
Qty: 14 CAPSULE | Refills: 0 | Status: SHIPPED | OUTPATIENT
Start: 2021-01-08 | End: 2021-01-15

## 2021-01-20 ENCOUNTER — ROUTINE PRENATAL (OUTPATIENT)
Dept: OBSTETRICS AND GYNECOLOGY | Facility: CLINIC | Age: 21
End: 2021-01-20
Payer: MEDICAID

## 2021-01-20 VITALS
BODY MASS INDEX: 35.91 KG/M2 | DIASTOLIC BLOOD PRESSURE: 70 MMHG | SYSTOLIC BLOOD PRESSURE: 120 MMHG | WEIGHT: 190.06 LBS

## 2021-01-20 DIAGNOSIS — Z34.83 ENCOUNTER FOR SUPERVISION OF OTHER NORMAL PREGNANCY IN THIRD TRIMESTER: Primary | ICD-10-CM

## 2021-01-20 DIAGNOSIS — Z87.798 HISTORY OF CONGENITAL ABNORMALITY: ICD-10-CM

## 2021-01-20 PROCEDURE — 99213 OFFICE O/P EST LOW 20 MIN: CPT | Mod: TH,S$PBB,, | Performed by: ADVANCED PRACTICE MIDWIFE

## 2021-01-20 PROCEDURE — 99212 OFFICE O/P EST SF 10 MIN: CPT | Mod: PBBFAC,TH,PN | Performed by: ADVANCED PRACTICE MIDWIFE

## 2021-01-20 PROCEDURE — 99213 PR OFFICE/OUTPT VISIT, EST, LEVL III, 20-29 MIN: ICD-10-PCS | Mod: TH,S$PBB,, | Performed by: ADVANCED PRACTICE MIDWIFE

## 2021-01-20 PROCEDURE — 99999 PR PBB SHADOW E&M-EST. PATIENT-LVL II: CPT | Mod: PBBFAC,,, | Performed by: ADVANCED PRACTICE MIDWIFE

## 2021-01-20 PROCEDURE — 99999 PR PBB SHADOW E&M-EST. PATIENT-LVL II: ICD-10-PCS | Mod: PBBFAC,,, | Performed by: ADVANCED PRACTICE MIDWIFE

## 2021-01-20 RX ORDER — CLOTRIMAZOLE AND BETAMETHASONE DIPROPIONATE 10; .64 MG/G; MG/G
CREAM TOPICAL
Qty: 15 G | Refills: 1 | Status: ON HOLD | OUTPATIENT
Start: 2021-01-20 | End: 2021-03-01 | Stop reason: HOSPADM

## 2021-02-03 ENCOUNTER — ROUTINE PRENATAL (OUTPATIENT)
Dept: OBSTETRICS AND GYNECOLOGY | Facility: CLINIC | Age: 21
End: 2021-02-03
Payer: MEDICAID

## 2021-02-03 VITALS
WEIGHT: 192.69 LBS | DIASTOLIC BLOOD PRESSURE: 72 MMHG | SYSTOLIC BLOOD PRESSURE: 110 MMHG | BODY MASS INDEX: 36.41 KG/M2

## 2021-02-03 DIAGNOSIS — Z87.798 HISTORY OF CONGENITAL ABNORMALITY: ICD-10-CM

## 2021-02-03 DIAGNOSIS — Z3A.35 35 WEEKS GESTATION OF PREGNANCY: ICD-10-CM

## 2021-02-03 DIAGNOSIS — Z34.83 ENCOUNTER FOR SUPERVISION OF OTHER NORMAL PREGNANCY IN THIRD TRIMESTER: ICD-10-CM

## 2021-02-03 DIAGNOSIS — R30.0 DYSURIA: Primary | ICD-10-CM

## 2021-02-03 DIAGNOSIS — N30.01 ACUTE CYSTITIS WITH HEMATURIA: ICD-10-CM

## 2021-02-03 PROCEDURE — 99999 PR PBB SHADOW E&M-EST. PATIENT-LVL II: ICD-10-PCS | Mod: PBBFAC,,, | Performed by: ADVANCED PRACTICE MIDWIFE

## 2021-02-03 PROCEDURE — 99999 PR PBB SHADOW E&M-EST. PATIENT-LVL II: CPT | Mod: PBBFAC,,, | Performed by: ADVANCED PRACTICE MIDWIFE

## 2021-02-03 PROCEDURE — 99213 PR OFFICE/OUTPT VISIT, EST, LEVL III, 20-29 MIN: ICD-10-PCS | Mod: TH,S$PBB,, | Performed by: ADVANCED PRACTICE MIDWIFE

## 2021-02-03 PROCEDURE — 99213 OFFICE O/P EST LOW 20 MIN: CPT | Mod: TH,S$PBB,, | Performed by: ADVANCED PRACTICE MIDWIFE

## 2021-02-03 PROCEDURE — 87088 URINE BACTERIA CULTURE: CPT

## 2021-02-03 PROCEDURE — 99212 OFFICE O/P EST SF 10 MIN: CPT | Mod: PBBFAC,TH,PN | Performed by: ADVANCED PRACTICE MIDWIFE

## 2021-02-03 PROCEDURE — 87086 URINE CULTURE/COLONY COUNT: CPT

## 2021-02-03 RX ORDER — NITROFURANTOIN 25; 75 MG/1; MG/1
100 CAPSULE ORAL 2 TIMES DAILY
Qty: 10 CAPSULE | Refills: 0 | Status: SHIPPED | OUTPATIENT
Start: 2021-02-03 | End: 2021-02-08

## 2021-02-05 LAB — BACTERIA UR CULT: ABNORMAL

## 2021-02-07 ENCOUNTER — HOSPITAL ENCOUNTER (EMERGENCY)
Facility: OTHER | Age: 21
Discharge: HOME | End: 2021-02-07
Attending: OBSTETRICS & GYNECOLOGY
Payer: MEDICAID

## 2021-02-07 VITALS
DIASTOLIC BLOOD PRESSURE: 76 MMHG | RESPIRATION RATE: 18 BRPM | BODY MASS INDEX: 36.41 KG/M2 | SYSTOLIC BLOOD PRESSURE: 122 MMHG | HEIGHT: 61 IN | TEMPERATURE: 98 F | OXYGEN SATURATION: 98 % | HEART RATE: 99 BPM

## 2021-02-07 DIAGNOSIS — O23.43 URINARY TRACT INFECTION AFFECTING CARE OF MOTHER IN THIRD TRIMESTER, ANTEPARTUM: ICD-10-CM

## 2021-02-07 DIAGNOSIS — Z3A.36 36 WEEKS GESTATION OF PREGNANCY: Primary | ICD-10-CM

## 2021-02-07 LAB
ALBUMIN SERPL BCP-MCNC: 2.9 G/DL (ref 3.5–5.2)
ALP SERPL-CCNC: 158 U/L (ref 55–135)
ALT SERPL W/O P-5'-P-CCNC: 13 U/L (ref 10–44)
ANION GAP SERPL CALC-SCNC: 11 MMOL/L (ref 8–16)
AST SERPL-CCNC: 13 U/L (ref 10–40)
BACTERIA #/AREA URNS HPF: ABNORMAL /HPF
BASOPHILS # BLD AUTO: 0.03 K/UL (ref 0–0.2)
BASOPHILS NFR BLD: 0.2 % (ref 0–1.9)
BILIRUB SERPL-MCNC: 0.2 MG/DL (ref 0.1–1)
BILIRUB SERPL-MCNC: NORMAL MG/DL
BILIRUB UR QL STRIP: NEGATIVE
BLOOD URINE, POC: 250
BUN SERPL-MCNC: 5 MG/DL (ref 6–20)
CALCIUM SERPL-MCNC: 9.6 MG/DL (ref 8.7–10.5)
CHLORIDE SERPL-SCNC: 106 MMOL/L (ref 95–110)
CLARITY UR: CLEAR
CO2 SERPL-SCNC: 20 MMOL/L (ref 23–29)
COLOR UR: YELLOW
COLOR, POC UA: YELLOW
CREAT SERPL-MCNC: 0.6 MG/DL (ref 0.5–1.4)
DIFFERENTIAL METHOD: ABNORMAL
EOSINOPHIL # BLD AUTO: 0.1 K/UL (ref 0–0.5)
EOSINOPHIL NFR BLD: 0.6 % (ref 0–8)
ERYTHROCYTE [DISTWIDTH] IN BLOOD BY AUTOMATED COUNT: 13.8 % (ref 11.5–14.5)
EST. GFR  (AFRICAN AMERICAN): >60 ML/MIN/1.73 M^2
EST. GFR  (NON AFRICAN AMERICAN): >60 ML/MIN/1.73 M^2
GLUCOSE SERPL-MCNC: 108 MG/DL (ref 70–110)
GLUCOSE UR QL STRIP: NEGATIVE
GLUCOSE UR QL STRIP: NORMAL
HCT VFR BLD AUTO: 41 % (ref 37–48.5)
HGB BLD-MCNC: 13.7 G/DL (ref 12–16)
HGB UR QL STRIP: ABNORMAL
HYALINE CASTS #/AREA URNS LPF: 2 /LPF
IMM GRANULOCYTES # BLD AUTO: 0.08 K/UL (ref 0–0.04)
IMM GRANULOCYTES NFR BLD AUTO: 0.4 % (ref 0–0.5)
KETONES UR QL STRIP: NEGATIVE
KETONES UR QL STRIP: NORMAL
LEUKOCYTE ESTERASE UR QL STRIP: ABNORMAL
LEUKOCYTE ESTERASE URINE, POC: NORMAL
LYMPHOCYTES # BLD AUTO: 2.7 K/UL (ref 1–4.8)
LYMPHOCYTES NFR BLD: 14 % (ref 18–48)
MCH RBC QN AUTO: 29.8 PG (ref 27–31)
MCHC RBC AUTO-ENTMCNC: 33.4 G/DL (ref 32–36)
MCV RBC AUTO: 89 FL (ref 82–98)
MICROSCOPIC COMMENT: ABNORMAL
MONOCYTES # BLD AUTO: 1 K/UL (ref 0.3–1)
MONOCYTES NFR BLD: 5 % (ref 4–15)
NEUTROPHILS # BLD AUTO: 15.3 K/UL (ref 1.8–7.7)
NEUTROPHILS NFR BLD: 79.8 % (ref 38–73)
NITRITE UR QL STRIP: NEGATIVE
NITRITE, POC UA: NORMAL
NRBC BLD-RTO: 0 /100 WBC
PH UR STRIP: 7 [PH] (ref 5–8)
PH, POC UA: 6
PLATELET # BLD AUTO: 175 K/UL (ref 150–350)
PMV BLD AUTO: 12.9 FL (ref 9.2–12.9)
POTASSIUM SERPL-SCNC: 4 MMOL/L (ref 3.5–5.1)
PROT SERPL-MCNC: 7.3 G/DL (ref 6–8.4)
PROT UR QL STRIP: ABNORMAL
PROTEIN, POC: NORMAL
RBC # BLD AUTO: 4.59 M/UL (ref 4–5.4)
RBC #/AREA URNS HPF: 12 /HPF (ref 0–4)
SODIUM SERPL-SCNC: 137 MMOL/L (ref 136–145)
SP GR UR STRIP: >=1.03 (ref 1–1.03)
SPECIFIC GRAVITY, POC UA: 1.01
URN SPEC COLLECT METH UR: ABNORMAL
UROBILINOGEN UR STRIP-ACNC: NEGATIVE EU/DL
UROBILINOGEN, POC UA: NORMAL
WBC # BLD AUTO: 19.23 K/UL (ref 3.9–12.7)
WBC #/AREA URNS HPF: >100 /HPF (ref 0–5)
WBC CLUMPS URNS QL MICRO: ABNORMAL

## 2021-02-07 PROCEDURE — 99284 EMERGENCY DEPT VISIT MOD MDM: CPT | Mod: 25

## 2021-02-07 PROCEDURE — 99284 EMERGENCY DEPT VISIT MOD MDM: CPT | Mod: 25,,, | Performed by: OBSTETRICS & GYNECOLOGY

## 2021-02-07 PROCEDURE — 59025 FETAL NON-STRESS TEST: CPT | Mod: 26,,, | Performed by: OBSTETRICS & GYNECOLOGY

## 2021-02-07 PROCEDURE — 80053 COMPREHEN METABOLIC PANEL: CPT

## 2021-02-07 PROCEDURE — 96361 HYDRATE IV INFUSION ADD-ON: CPT | Mod: 59

## 2021-02-07 PROCEDURE — 96360 HYDRATION IV INFUSION INIT: CPT

## 2021-02-07 PROCEDURE — 59025 FETAL NON-STRESS TEST: CPT

## 2021-02-07 PROCEDURE — 81000 URINALYSIS NONAUTO W/SCOPE: CPT

## 2021-02-07 PROCEDURE — 85025 COMPLETE CBC W/AUTO DIFF WBC: CPT

## 2021-02-07 PROCEDURE — 25000003 PHARM REV CODE 250: Performed by: STUDENT IN AN ORGANIZED HEALTH CARE EDUCATION/TRAINING PROGRAM

## 2021-02-07 PROCEDURE — 87086 URINE CULTURE/COLONY COUNT: CPT

## 2021-02-07 PROCEDURE — 59025 PR FETAL 2N-STRESS TEST: ICD-10-PCS | Mod: 26,,, | Performed by: OBSTETRICS & GYNECOLOGY

## 2021-02-07 PROCEDURE — 99284 PR EMERGENCY DEPT VISIT,LEVEL IV: ICD-10-PCS | Mod: 25,,, | Performed by: OBSTETRICS & GYNECOLOGY

## 2021-02-07 PROCEDURE — 63600175 PHARM REV CODE 636 W HCPCS: Performed by: STUDENT IN AN ORGANIZED HEALTH CARE EDUCATION/TRAINING PROGRAM

## 2021-02-07 RX ORDER — ACETAMINOPHEN 325 MG/1
650 TABLET ORAL ONCE
Status: COMPLETED | OUTPATIENT
Start: 2021-02-07 | End: 2021-02-07

## 2021-02-07 RX ADMIN — SODIUM CHLORIDE, SODIUM LACTATE, POTASSIUM CHLORIDE, AND CALCIUM CHLORIDE 1000 ML: .6; .31; .03; .02 INJECTION, SOLUTION INTRAVENOUS at 04:02

## 2021-02-07 RX ADMIN — ACETAMINOPHEN 650 MG: 325 TABLET ORAL at 05:02

## 2021-02-09 ENCOUNTER — ROUTINE PRENATAL (OUTPATIENT)
Dept: OBSTETRICS AND GYNECOLOGY | Facility: CLINIC | Age: 21
End: 2021-02-09
Payer: MEDICAID

## 2021-02-09 ENCOUNTER — PROCEDURE VISIT (OUTPATIENT)
Dept: OBSTETRICS AND GYNECOLOGY | Facility: CLINIC | Age: 21
End: 2021-02-09
Payer: MEDICAID

## 2021-02-09 VITALS
DIASTOLIC BLOOD PRESSURE: 70 MMHG | WEIGHT: 195.75 LBS | SYSTOLIC BLOOD PRESSURE: 100 MMHG | BODY MASS INDEX: 36.99 KG/M2

## 2021-02-09 DIAGNOSIS — B00.9 HSV-2 INFECTION: ICD-10-CM

## 2021-02-09 DIAGNOSIS — Z3A.36 36 WEEKS GESTATION OF PREGNANCY: Primary | ICD-10-CM

## 2021-02-09 DIAGNOSIS — Z87.798 HISTORY OF CONGENITAL ABNORMALITY: ICD-10-CM

## 2021-02-09 DIAGNOSIS — Z3A.36 36 WEEKS GESTATION OF PREGNANCY: ICD-10-CM

## 2021-02-09 LAB — BACTERIA UR CULT: NO GROWTH

## 2021-02-09 PROCEDURE — 99999 PR PBB SHADOW E&M-EST. PATIENT-LVL III: ICD-10-PCS | Mod: PBBFAC,,, | Performed by: ADVANCED PRACTICE MIDWIFE

## 2021-02-09 PROCEDURE — 87081 CULTURE SCREEN ONLY: CPT

## 2021-02-09 PROCEDURE — 99999 PR PBB SHADOW E&M-EST. PATIENT-LVL III: CPT | Mod: PBBFAC,,, | Performed by: ADVANCED PRACTICE MIDWIFE

## 2021-02-09 PROCEDURE — 99213 PR OFFICE/OUTPT VISIT, EST, LEVL III, 20-29 MIN: ICD-10-PCS | Mod: TH,S$PBB,, | Performed by: ADVANCED PRACTICE MIDWIFE

## 2021-02-09 PROCEDURE — 86703 HIV-1/HIV-2 1 RESULT ANTBDY: CPT

## 2021-02-09 PROCEDURE — 99213 OFFICE O/P EST LOW 20 MIN: CPT | Mod: PBBFAC,PN | Performed by: ADVANCED PRACTICE MIDWIFE

## 2021-02-09 PROCEDURE — 99213 OFFICE O/P EST LOW 20 MIN: CPT | Mod: TH,S$PBB,, | Performed by: ADVANCED PRACTICE MIDWIFE

## 2021-02-09 PROCEDURE — 86592 SYPHILIS TEST NON-TREP QUAL: CPT

## 2021-02-09 RX ORDER — VALACYCLOVIR HYDROCHLORIDE 500 MG/1
500 TABLET, FILM COATED ORAL DAILY
Qty: 30 TABLET | Refills: 1 | Status: ON HOLD | OUTPATIENT
Start: 2021-02-09 | End: 2021-03-01 | Stop reason: HOSPADM

## 2021-02-10 ENCOUNTER — HOSPITAL ENCOUNTER (EMERGENCY)
Facility: OTHER | Age: 21
Discharge: HOME | End: 2021-02-11
Attending: OBSTETRICS & GYNECOLOGY
Payer: MEDICAID

## 2021-02-10 VITALS
DIASTOLIC BLOOD PRESSURE: 71 MMHG | OXYGEN SATURATION: 97 % | HEART RATE: 103 BPM | TEMPERATURE: 99 F | SYSTOLIC BLOOD PRESSURE: 125 MMHG | RESPIRATION RATE: 16 BRPM

## 2021-02-10 DIAGNOSIS — R30.0 DYSURIA: Primary | ICD-10-CM

## 2021-02-10 DIAGNOSIS — Z3A.36 36 WEEKS GESTATION OF PREGNANCY: ICD-10-CM

## 2021-02-10 LAB
HIV 1+2 AB+HIV1 P24 AG SERPL QL IA: NEGATIVE
RPR SER QL: NORMAL

## 2021-02-10 PROCEDURE — 99283 PR EMERGENCY DEPT VISIT,LEVEL III: ICD-10-PCS | Mod: 25,,, | Performed by: OBSTETRICS & GYNECOLOGY

## 2021-02-10 PROCEDURE — 99283 EMERGENCY DEPT VISIT LOW MDM: CPT | Mod: 25,,, | Performed by: OBSTETRICS & GYNECOLOGY

## 2021-02-10 PROCEDURE — 87086 URINE CULTURE/COLONY COUNT: CPT

## 2021-02-10 PROCEDURE — 59025 FETAL NON-STRESS TEST: CPT

## 2021-02-10 PROCEDURE — 81000 URINALYSIS NONAUTO W/SCOPE: CPT

## 2021-02-10 PROCEDURE — 59025 FETAL NON-STRESS TEST: CPT | Mod: 26,,, | Performed by: OBSTETRICS & GYNECOLOGY

## 2021-02-10 PROCEDURE — 99284 EMERGENCY DEPT VISIT MOD MDM: CPT | Mod: 25

## 2021-02-10 PROCEDURE — 59025 PR FETAL 2N-STRESS TEST: ICD-10-PCS | Mod: 26,,, | Performed by: OBSTETRICS & GYNECOLOGY

## 2021-02-10 PROCEDURE — 87088 URINE BACTERIA CULTURE: CPT

## 2021-02-11 ENCOUNTER — TELEPHONE (OUTPATIENT)
Dept: OBSTETRICS AND GYNECOLOGY | Facility: HOSPITAL | Age: 21
End: 2021-02-11

## 2021-02-11 DIAGNOSIS — N32.89 BLADDER SPASM: Primary | ICD-10-CM

## 2021-02-11 LAB
BACTERIA #/AREA URNS HPF: ABNORMAL /HPF
BILIRUB UR QL STRIP: NEGATIVE
CLARITY UR: ABNORMAL
COLOR UR: YELLOW
GLUCOSE UR QL STRIP: NEGATIVE
HGB UR QL STRIP: ABNORMAL
HYALINE CASTS #/AREA URNS LPF: 0 /LPF
KETONES UR QL STRIP: NEGATIVE
LEUKOCYTE ESTERASE UR QL STRIP: ABNORMAL
MICROSCOPIC COMMENT: ABNORMAL
NITRITE UR QL STRIP: NEGATIVE
PH UR STRIP: 7 [PH] (ref 5–8)
PROT UR QL STRIP: ABNORMAL
RBC #/AREA URNS HPF: 5 /HPF (ref 0–4)
SP GR UR STRIP: 1.01 (ref 1–1.03)
URN SPEC COLLECT METH UR: ABNORMAL
UROBILINOGEN UR STRIP-ACNC: NEGATIVE EU/DL
WBC #/AREA URNS HPF: >100 /HPF (ref 0–5)
WBC CLUMPS URNS QL MICRO: ABNORMAL

## 2021-02-11 RX ORDER — PHENAZOPYRIDINE HYDROCHLORIDE 100 MG/1
100 TABLET, FILM COATED ORAL 3 TIMES DAILY PRN
Qty: 30 TABLET | Refills: 0 | Status: SHIPPED | OUTPATIENT
Start: 2021-02-11 | End: 2021-02-21

## 2021-02-12 LAB — BACTERIA SPEC AEROBE CULT: NORMAL

## 2021-02-17 LAB — BACTERIA UR CULT: ABNORMAL

## 2021-02-24 ENCOUNTER — ROUTINE PRENATAL (OUTPATIENT)
Dept: OBSTETRICS AND GYNECOLOGY | Facility: CLINIC | Age: 21
End: 2021-02-24
Payer: MEDICAID

## 2021-02-24 VITALS
DIASTOLIC BLOOD PRESSURE: 82 MMHG | BODY MASS INDEX: 37.28 KG/M2 | WEIGHT: 197.31 LBS | SYSTOLIC BLOOD PRESSURE: 120 MMHG

## 2021-02-24 DIAGNOSIS — Z87.798 HISTORY OF CONGENITAL ABNORMALITY: ICD-10-CM

## 2021-02-24 DIAGNOSIS — Z34.83 ENCOUNTER FOR SUPERVISION OF OTHER NORMAL PREGNANCY IN THIRD TRIMESTER: Primary | ICD-10-CM

## 2021-02-24 PROCEDURE — 99213 OFFICE O/P EST LOW 20 MIN: CPT | Mod: TH,S$PBB,, | Performed by: ADVANCED PRACTICE MIDWIFE

## 2021-02-24 PROCEDURE — 99212 OFFICE O/P EST SF 10 MIN: CPT | Mod: PBBFAC,TH,PN | Performed by: ADVANCED PRACTICE MIDWIFE

## 2021-02-24 PROCEDURE — 99999 PR PBB SHADOW E&M-EST. PATIENT-LVL II: ICD-10-PCS | Mod: PBBFAC,,, | Performed by: ADVANCED PRACTICE MIDWIFE

## 2021-02-24 PROCEDURE — 99213 PR OFFICE/OUTPT VISIT, EST, LEVL III, 20-29 MIN: ICD-10-PCS | Mod: TH,S$PBB,, | Performed by: ADVANCED PRACTICE MIDWIFE

## 2021-02-24 PROCEDURE — 99999 PR PBB SHADOW E&M-EST. PATIENT-LVL II: CPT | Mod: PBBFAC,,, | Performed by: ADVANCED PRACTICE MIDWIFE

## 2021-02-24 RX ORDER — SIMETHICONE 80 MG
1 TABLET,CHEWABLE ORAL 4 TIMES DAILY PRN
Status: CANCELLED | OUTPATIENT
Start: 2021-02-24

## 2021-02-24 RX ORDER — CALCIUM CARBONATE 200(500)MG
500 TABLET,CHEWABLE ORAL 3 TIMES DAILY PRN
Status: CANCELLED | OUTPATIENT
Start: 2021-02-24

## 2021-02-24 RX ORDER — ONDANSETRON 4 MG/1
8 TABLET, ORALLY DISINTEGRATING ORAL EVERY 8 HOURS PRN
Status: CANCELLED | OUTPATIENT
Start: 2021-02-24

## 2021-02-24 RX ORDER — OXYTOCIN/RINGER'S LACTATE 30/500 ML
334 PLASTIC BAG, INJECTION (ML) INTRAVENOUS ONCE
Status: CANCELLED | OUTPATIENT
Start: 2021-02-24 | End: 2021-02-24

## 2021-02-24 RX ORDER — OXYTOCIN/RINGER'S LACTATE 30/500 ML
95 PLASTIC BAG, INJECTION (ML) INTRAVENOUS ONCE
Status: CANCELLED | OUTPATIENT
Start: 2021-02-24 | End: 2021-02-24

## 2021-02-28 ENCOUNTER — HOSPITAL ENCOUNTER (INPATIENT)
Facility: OTHER | Age: 21
LOS: 1 days | Discharge: HOME | End: 2021-03-01
Attending: OBSTETRICS & GYNECOLOGY | Admitting: OBSTETRICS & GYNECOLOGY
Payer: MEDICAID

## 2021-02-28 ENCOUNTER — ANESTHESIA EVENT (OUTPATIENT)
Dept: EMERGENCY MEDICINE | Facility: OTHER | Age: 21
End: 2021-02-28
Payer: MEDICAID

## 2021-02-28 ENCOUNTER — ANESTHESIA (OUTPATIENT)
Dept: EMERGENCY MEDICINE | Facility: OTHER | Age: 21
End: 2021-02-28
Payer: MEDICAID

## 2021-02-28 ENCOUNTER — ANESTHESIA EVENT (OUTPATIENT)
Dept: EMERGENCY MEDICINE | Facility: OTHER | Age: 21
End: 2021-02-28

## 2021-02-28 ENCOUNTER — ANESTHESIA (OUTPATIENT)
Dept: EMERGENCY MEDICINE | Facility: OTHER | Age: 21
End: 2021-02-28

## 2021-02-28 DIAGNOSIS — Z34.83 ENCOUNTER FOR SUPERVISION OF OTHER NORMAL PREGNANCY IN THIRD TRIMESTER: ICD-10-CM

## 2021-02-28 DIAGNOSIS — Z37.9 NORMAL LABOR: ICD-10-CM

## 2021-02-28 DIAGNOSIS — Z3A.39 39 WEEKS GESTATION OF PREGNANCY: ICD-10-CM

## 2021-02-28 LAB
ABO + RH BLD: NORMAL
ALBUMIN SERPL BCP-MCNC: 3.1 G/DL (ref 3.5–5.2)
ALP SERPL-CCNC: 189 U/L (ref 55–135)
ALT SERPL W/O P-5'-P-CCNC: 13 U/L (ref 10–44)
ANION GAP SERPL CALC-SCNC: 13 MMOL/L (ref 8–16)
AST SERPL-CCNC: 14 U/L (ref 10–40)
BASOPHILS # BLD AUTO: 0.03 K/UL (ref 0–0.2)
BASOPHILS # BLD AUTO: 0.05 K/UL (ref 0–0.2)
BASOPHILS NFR BLD: 0.2 % (ref 0–1.9)
BASOPHILS NFR BLD: 0.3 % (ref 0–1.9)
BILIRUB SERPL-MCNC: 0.4 MG/DL (ref 0.1–1)
BLD GP AB SCN CELLS X3 SERPL QL: NORMAL
BUN SERPL-MCNC: 9 MG/DL (ref 6–20)
CALCIUM SERPL-MCNC: 9.8 MG/DL (ref 8.7–10.5)
CHLORIDE SERPL-SCNC: 105 MMOL/L (ref 95–110)
CO2 SERPL-SCNC: 19 MMOL/L (ref 23–29)
CREAT SERPL-MCNC: 0.7 MG/DL (ref 0.5–1.4)
DIFFERENTIAL METHOD: ABNORMAL
DIFFERENTIAL METHOD: ABNORMAL
EOSINOPHIL # BLD AUTO: 0.1 K/UL (ref 0–0.5)
EOSINOPHIL # BLD AUTO: 0.1 K/UL (ref 0–0.5)
EOSINOPHIL NFR BLD: 0.4 % (ref 0–8)
EOSINOPHIL NFR BLD: 0.5 % (ref 0–8)
ERYTHROCYTE [DISTWIDTH] IN BLOOD BY AUTOMATED COUNT: 14.5 % (ref 11.5–14.5)
ERYTHROCYTE [DISTWIDTH] IN BLOOD BY AUTOMATED COUNT: 14.6 % (ref 11.5–14.5)
EST. GFR  (AFRICAN AMERICAN): >60 ML/MIN/1.73 M^2
EST. GFR  (NON AFRICAN AMERICAN): >60 ML/MIN/1.73 M^2
GLUCOSE SERPL-MCNC: 115 MG/DL (ref 70–110)
HCT VFR BLD AUTO: 35.1 % (ref 37–48.5)
HCT VFR BLD AUTO: 41.5 % (ref 37–48.5)
HGB BLD-MCNC: 11.5 G/DL (ref 12–16)
HGB BLD-MCNC: 13.7 G/DL (ref 12–16)
IMM GRANULOCYTES # BLD AUTO: 0.07 K/UL (ref 0–0.04)
IMM GRANULOCYTES # BLD AUTO: 0.08 K/UL (ref 0–0.04)
IMM GRANULOCYTES NFR BLD AUTO: 0.4 % (ref 0–0.5)
IMM GRANULOCYTES NFR BLD AUTO: 0.4 % (ref 0–0.5)
LYMPHOCYTES # BLD AUTO: 2.8 K/UL (ref 1–4.8)
LYMPHOCYTES # BLD AUTO: 3.6 K/UL (ref 1–4.8)
LYMPHOCYTES NFR BLD: 16.9 % (ref 18–48)
LYMPHOCYTES NFR BLD: 19.6 % (ref 18–48)
MCH RBC QN AUTO: 29.7 PG (ref 27–31)
MCH RBC QN AUTO: 29.8 PG (ref 27–31)
MCHC RBC AUTO-ENTMCNC: 32.8 G/DL (ref 32–36)
MCHC RBC AUTO-ENTMCNC: 33 G/DL (ref 32–36)
MCV RBC AUTO: 90 FL (ref 82–98)
MCV RBC AUTO: 91 FL (ref 82–98)
MONOCYTES # BLD AUTO: 0.8 K/UL (ref 0.3–1)
MONOCYTES # BLD AUTO: 0.9 K/UL (ref 0.3–1)
MONOCYTES NFR BLD: 4.5 % (ref 4–15)
MONOCYTES NFR BLD: 5.2 % (ref 4–15)
NEUTROPHILS # BLD AUTO: 12.9 K/UL (ref 1.8–7.7)
NEUTROPHILS # BLD AUTO: 13.9 K/UL (ref 1.8–7.7)
NEUTROPHILS NFR BLD: 74.8 % (ref 38–73)
NEUTROPHILS NFR BLD: 76.8 % (ref 38–73)
NRBC BLD-RTO: 0 /100 WBC
NRBC BLD-RTO: 0 /100 WBC
PLATELET # BLD AUTO: 165 K/UL (ref 150–350)
PLATELET # BLD AUTO: 192 K/UL (ref 150–350)
PMV BLD AUTO: 12.6 FL (ref 9.2–12.9)
PMV BLD AUTO: 12.6 FL (ref 9.2–12.9)
POTASSIUM SERPL-SCNC: 3.6 MMOL/L (ref 3.5–5.1)
PROT SERPL-MCNC: 7.5 G/DL (ref 6–8.4)
RBC # BLD AUTO: 3.86 M/UL (ref 4–5.4)
RBC # BLD AUTO: 4.62 M/UL (ref 4–5.4)
SARS-COV-2 RDRP RESP QL NAA+PROBE: NEGATIVE
SODIUM SERPL-SCNC: 137 MMOL/L (ref 136–145)
WBC # BLD AUTO: 16.81 K/UL (ref 3.9–12.7)
WBC # BLD AUTO: 18.61 K/UL (ref 3.9–12.7)

## 2021-02-28 PROCEDURE — 25000003 PHARM REV CODE 250: Performed by: STUDENT IN AN ORGANIZED HEALTH CARE EDUCATION/TRAINING PROGRAM

## 2021-02-28 PROCEDURE — 99285 EMERGENCY DEPT VISIT HI MDM: CPT | Mod: 25

## 2021-02-28 PROCEDURE — U0002 COVID-19 LAB TEST NON-CDC: HCPCS

## 2021-02-28 PROCEDURE — 86900 BLOOD TYPING SEROLOGIC ABO: CPT

## 2021-02-28 PROCEDURE — 11000001 HC ACUTE MED/SURG PRIVATE ROOM

## 2021-02-28 PROCEDURE — 80053 COMPREHEN METABOLIC PANEL: CPT

## 2021-02-28 PROCEDURE — 85025 COMPLETE CBC W/AUTO DIFF WBC: CPT

## 2021-02-28 PROCEDURE — 72200004 HC VAGINAL DELIVERY LEVEL I

## 2021-02-28 PROCEDURE — 36415 COLL VENOUS BLD VENIPUNCTURE: CPT

## 2021-02-28 PROCEDURE — 59409 PR OBSTETRICAL CARE,VAG DELIV ONLY: ICD-10-PCS | Mod: GB,,, | Performed by: OBSTETRICS & GYNECOLOGY

## 2021-02-28 PROCEDURE — 59409 OBSTETRICAL CARE: CPT | Mod: GB,,, | Performed by: OBSTETRICS & GYNECOLOGY

## 2021-02-28 PROCEDURE — 63600175 PHARM REV CODE 636 W HCPCS

## 2021-02-28 PROCEDURE — 72100002 HC LABOR CARE, 1ST 8 HOURS

## 2021-02-28 PROCEDURE — 63600175 PHARM REV CODE 636 W HCPCS: Performed by: ADVANCED PRACTICE MIDWIFE

## 2021-02-28 PROCEDURE — 0502F SUBSEQUENT PRENATAL CARE: CPT | Mod: ,,, | Performed by: ADVANCED PRACTICE MIDWIFE

## 2021-02-28 PROCEDURE — 0502F PR SUBSEQUENT PRENATAL CARE: ICD-10-PCS | Mod: ,,, | Performed by: ADVANCED PRACTICE MIDWIFE

## 2021-02-28 RX ORDER — ONDANSETRON 8 MG/1
8 TABLET, ORALLY DISINTEGRATING ORAL EVERY 8 HOURS PRN
Status: DISCONTINUED | OUTPATIENT
Start: 2021-02-28 | End: 2021-03-01 | Stop reason: HOSPADM

## 2021-02-28 RX ORDER — DOCUSATE SODIUM 100 MG/1
200 CAPSULE, LIQUID FILLED ORAL 2 TIMES DAILY PRN
Status: DISCONTINUED | OUTPATIENT
Start: 2021-02-28 | End: 2021-03-01 | Stop reason: HOSPADM

## 2021-02-28 RX ORDER — OXYTOCIN/RINGER'S LACTATE 30/500 ML
PLASTIC BAG, INJECTION (ML) INTRAVENOUS
Status: COMPLETED
Start: 2021-02-28 | End: 2021-02-28

## 2021-02-28 RX ORDER — HYDROCODONE BITARTRATE AND ACETAMINOPHEN 10; 325 MG/1; MG/1
1 TABLET ORAL EVERY 4 HOURS PRN
Status: DISCONTINUED | OUTPATIENT
Start: 2021-02-28 | End: 2021-03-01 | Stop reason: HOSPADM

## 2021-02-28 RX ORDER — DIPHENHYDRAMINE HYDROCHLORIDE 50 MG/ML
25 INJECTION INTRAMUSCULAR; INTRAVENOUS EVERY 4 HOURS PRN
Status: DISCONTINUED | OUTPATIENT
Start: 2021-02-28 | End: 2021-03-01 | Stop reason: HOSPADM

## 2021-02-28 RX ORDER — OXYTOCIN/RINGER'S LACTATE 30/500 ML
334 PLASTIC BAG, INJECTION (ML) INTRAVENOUS ONCE
Status: COMPLETED | OUTPATIENT
Start: 2021-02-28 | End: 2021-02-28

## 2021-02-28 RX ORDER — OXYTOCIN/RINGER'S LACTATE 30/500 ML
95 PLASTIC BAG, INJECTION (ML) INTRAVENOUS ONCE
Status: DISCONTINUED | OUTPATIENT
Start: 2021-02-28 | End: 2021-03-01 | Stop reason: HOSPADM

## 2021-02-28 RX ORDER — HYDROCORTISONE 25 MG/G
CREAM TOPICAL 3 TIMES DAILY PRN
Status: DISCONTINUED | OUTPATIENT
Start: 2021-02-28 | End: 2021-03-01 | Stop reason: HOSPADM

## 2021-02-28 RX ORDER — SIMETHICONE 80 MG
1 TABLET,CHEWABLE ORAL EVERY 6 HOURS PRN
Status: DISCONTINUED | OUTPATIENT
Start: 2021-02-28 | End: 2021-03-01 | Stop reason: HOSPADM

## 2021-02-28 RX ORDER — ONDANSETRON 8 MG/1
8 TABLET, ORALLY DISINTEGRATING ORAL EVERY 8 HOURS PRN
Status: DISCONTINUED | OUTPATIENT
Start: 2021-02-28 | End: 2021-02-28

## 2021-02-28 RX ORDER — OXYTOCIN/RINGER'S LACTATE 30/500 ML
95 PLASTIC BAG, INJECTION (ML) INTRAVENOUS ONCE
Status: DISCONTINUED | OUTPATIENT
Start: 2021-02-28 | End: 2021-02-28

## 2021-02-28 RX ORDER — ACETAMINOPHEN 325 MG/1
650 TABLET ORAL EVERY 6 HOURS PRN
Status: DISCONTINUED | OUTPATIENT
Start: 2021-02-28 | End: 2021-03-01 | Stop reason: HOSPADM

## 2021-02-28 RX ORDER — DIPHENHYDRAMINE HCL 25 MG
25 CAPSULE ORAL EVERY 4 HOURS PRN
Status: DISCONTINUED | OUTPATIENT
Start: 2021-02-28 | End: 2021-03-01 | Stop reason: HOSPADM

## 2021-02-28 RX ORDER — LIDOCAINE HYDROCHLORIDE 10 MG/ML
INJECTION INFILTRATION; PERINEURAL
Status: DISPENSED
Start: 2021-02-28 | End: 2021-02-28

## 2021-02-28 RX ORDER — PRENATAL WITH FERROUS FUM AND FOLIC ACID 3080; 920; 120; 400; 22; 1.84; 3; 20; 10; 1; 12; 200; 27; 25; 2 [IU]/1; [IU]/1; MG/1; [IU]/1; MG/1; MG/1; MG/1; MG/1; MG/1; MG/1; UG/1; MG/1; MG/1; MG/1; MG/1
1 TABLET ORAL DAILY
Status: DISCONTINUED | OUTPATIENT
Start: 2021-02-28 | End: 2021-03-01 | Stop reason: HOSPADM

## 2021-02-28 RX ORDER — CALCIUM CARBONATE 200(500)MG
500 TABLET,CHEWABLE ORAL 3 TIMES DAILY PRN
Status: DISCONTINUED | OUTPATIENT
Start: 2021-02-28 | End: 2021-02-28

## 2021-02-28 RX ORDER — SIMETHICONE 80 MG
1 TABLET,CHEWABLE ORAL 4 TIMES DAILY PRN
Status: DISCONTINUED | OUTPATIENT
Start: 2021-02-28 | End: 2021-02-28

## 2021-02-28 RX ORDER — IBUPROFEN 600 MG/1
600 TABLET ORAL EVERY 6 HOURS
Status: DISCONTINUED | OUTPATIENT
Start: 2021-02-28 | End: 2021-03-01 | Stop reason: HOSPADM

## 2021-02-28 RX ORDER — CLINDAMYCIN PHOSPHATE 900 MG/50ML
900 INJECTION, SOLUTION INTRAVENOUS ONCE AS NEEDED
Status: DISCONTINUED | OUTPATIENT
Start: 2021-02-28 | End: 2021-02-28

## 2021-02-28 RX ORDER — HYDROCODONE BITARTRATE AND ACETAMINOPHEN 5; 325 MG/1; MG/1
1 TABLET ORAL EVERY 4 HOURS PRN
Status: DISCONTINUED | OUTPATIENT
Start: 2021-02-28 | End: 2021-03-01 | Stop reason: HOSPADM

## 2021-02-28 RX ADMIN — Medication 334 MILLI-UNITS/MIN: at 04:02

## 2021-02-28 RX ADMIN — IBUPROFEN 600 MG: 600 TABLET, FILM COATED ORAL at 05:02

## 2021-02-28 RX ADMIN — IBUPROFEN 600 MG: 600 TABLET, FILM COATED ORAL at 02:02

## 2021-02-28 RX ADMIN — IBUPROFEN 600 MG: 600 TABLET, FILM COATED ORAL at 08:02

## 2021-02-28 RX ADMIN — HYDROCODONE BITARTRATE AND ACETAMINOPHEN 1 TABLET: 5; 325 TABLET ORAL at 08:02

## 2021-02-28 RX ADMIN — HYDROCODONE BITARTRATE AND ACETAMINOPHEN 1 TABLET: 10; 325 TABLET ORAL at 05:02

## 2021-03-01 VITALS
HEART RATE: 103 BPM | RESPIRATION RATE: 18 BRPM | OXYGEN SATURATION: 97 % | DIASTOLIC BLOOD PRESSURE: 50 MMHG | HEIGHT: 61 IN | TEMPERATURE: 98 F | SYSTOLIC BLOOD PRESSURE: 108 MMHG | WEIGHT: 197.31 LBS | BODY MASS INDEX: 37.25 KG/M2

## 2021-03-01 PROCEDURE — 99238 PR HOSPITAL DISCHARGE DAY,<30 MIN: ICD-10-PCS | Mod: ,,, | Performed by: OBSTETRICS & GYNECOLOGY

## 2021-03-01 PROCEDURE — 25000003 PHARM REV CODE 250: Performed by: STUDENT IN AN ORGANIZED HEALTH CARE EDUCATION/TRAINING PROGRAM

## 2021-03-01 PROCEDURE — 99238 HOSP IP/OBS DSCHRG MGMT 30/<: CPT | Mod: ,,, | Performed by: OBSTETRICS & GYNECOLOGY

## 2021-03-01 RX ORDER — IBUPROFEN 600 MG/1
600 TABLET ORAL EVERY 6 HOURS PRN
Qty: 60 TABLET | Refills: 0 | Status: SHIPPED | OUTPATIENT
Start: 2021-03-01 | End: 2022-01-23 | Stop reason: ALTCHOICE

## 2021-03-01 RX ORDER — MEDROXYPROGESTERONE ACETATE 150 MG/ML
150 INJECTION, SUSPENSION INTRAMUSCULAR ONCE
Status: DISCONTINUED | OUTPATIENT
Start: 2021-03-01 | End: 2021-03-01 | Stop reason: HOSPADM

## 2021-03-01 RX ORDER — DOCUSATE SODIUM 100 MG/1
200 CAPSULE, LIQUID FILLED ORAL 2 TIMES DAILY PRN
Qty: 60 CAPSULE | Refills: 0 | Status: SHIPPED | OUTPATIENT
Start: 2021-03-01 | End: 2022-01-23 | Stop reason: ALTCHOICE

## 2021-03-01 RX ADMIN — HYDROCODONE BITARTRATE AND ACETAMINOPHEN 1 TABLET: 10; 325 TABLET ORAL at 01:03

## 2021-03-01 RX ADMIN — HYDROCODONE BITARTRATE AND ACETAMINOPHEN 1 TABLET: 10; 325 TABLET ORAL at 05:03

## 2021-03-01 RX ADMIN — IBUPROFEN 600 MG: 600 TABLET, FILM COATED ORAL at 01:03

## 2021-03-01 RX ADMIN — IBUPROFEN 600 MG: 600 TABLET, FILM COATED ORAL at 09:03

## 2021-03-02 ENCOUNTER — TELEPHONE (OUTPATIENT)
Dept: OBSTETRICS AND GYNECOLOGY | Facility: CLINIC | Age: 21
End: 2021-03-02

## 2021-03-21 ENCOUNTER — PATIENT MESSAGE (OUTPATIENT)
Dept: ADMINISTRATIVE | Facility: OTHER | Age: 21
End: 2021-03-21

## 2021-03-23 ENCOUNTER — TELEPHONE (OUTPATIENT)
Dept: OBSTETRICS AND GYNECOLOGY | Facility: OTHER | Age: 21
End: 2021-03-23

## 2021-04-13 ENCOUNTER — POSTPARTUM VISIT (OUTPATIENT)
Dept: OBSTETRICS AND GYNECOLOGY | Facility: CLINIC | Age: 21
End: 2021-04-13
Payer: MEDICAID

## 2021-04-13 VITALS — WEIGHT: 182.75 LBS | HEIGHT: 61 IN | BODY MASS INDEX: 34.5 KG/M2

## 2021-04-13 DIAGNOSIS — Z30.017 ENCOUNTER FOR INITIAL PRESCRIPTION OF IMPLANTABLE SUBDERMAL CONTRACEPTIVE: Primary | ICD-10-CM

## 2021-04-13 PROCEDURE — 99499 UNLISTED E&M SERVICE: CPT | Mod: S$PBB,,, | Performed by: OBSTETRICS & GYNECOLOGY

## 2021-04-13 PROCEDURE — 99999 PR PBB SHADOW E&M-EST. PATIENT-LVL II: CPT | Mod: PBBFAC,,, | Performed by: OBSTETRICS & GYNECOLOGY

## 2021-04-13 PROCEDURE — 99499 NO LOS: ICD-10-PCS | Mod: S$PBB,,, | Performed by: OBSTETRICS & GYNECOLOGY

## 2021-04-13 PROCEDURE — 99212 OFFICE O/P EST SF 10 MIN: CPT | Mod: PBBFAC,PN | Performed by: OBSTETRICS & GYNECOLOGY

## 2021-04-13 PROCEDURE — 99999 PR PBB SHADOW E&M-EST. PATIENT-LVL II: ICD-10-PCS | Mod: PBBFAC,,, | Performed by: OBSTETRICS & GYNECOLOGY

## 2021-04-13 RX ORDER — DEXTROAMPHETAMINE SACCHARATE, AMPHETAMINE ASPARTATE, DEXTROAMPHETAMINE SULFATE AND AMPHETAMINE SULFATE 5; 5; 5; 5 MG/1; MG/1; MG/1; MG/1
0.5 TABLET ORAL 2 TIMES DAILY PRN
Status: ON HOLD | COMMUNITY
Start: 2021-04-05 | End: 2022-08-01 | Stop reason: HOSPADM

## 2021-04-15 ENCOUNTER — PATIENT MESSAGE (OUTPATIENT)
Dept: RESEARCH | Facility: HOSPITAL | Age: 21
End: 2021-04-15

## 2022-01-23 ENCOUNTER — HOSPITAL ENCOUNTER (EMERGENCY)
Facility: HOSPITAL | Age: 22
Discharge: HOME | End: 2022-01-23
Attending: EMERGENCY MEDICINE
Payer: MEDICAID

## 2022-01-23 VITALS
TEMPERATURE: 98 F | HEIGHT: 61 IN | OXYGEN SATURATION: 98 % | SYSTOLIC BLOOD PRESSURE: 113 MMHG | DIASTOLIC BLOOD PRESSURE: 58 MMHG | BODY MASS INDEX: 31.15 KG/M2 | WEIGHT: 165 LBS | RESPIRATION RATE: 17 BRPM | HEART RATE: 94 BPM

## 2022-01-23 DIAGNOSIS — R09.1 PLEURISY: ICD-10-CM

## 2022-01-23 DIAGNOSIS — Z3A.08 8 WEEKS GESTATION OF PREGNANCY: Primary | ICD-10-CM

## 2022-01-23 DIAGNOSIS — R07.9 CHEST PAIN: ICD-10-CM

## 2022-01-23 LAB
ALBUMIN SERPL BCP-MCNC: 3.8 G/DL (ref 3.5–5.2)
ALP SERPL-CCNC: 94 U/L (ref 55–135)
ALT SERPL W/O P-5'-P-CCNC: 14 U/L (ref 10–44)
ANION GAP SERPL CALC-SCNC: 11 MMOL/L (ref 8–16)
AST SERPL-CCNC: 14 U/L (ref 10–40)
B-HCG UR QL: POSITIVE
BASOPHILS # BLD AUTO: 0.04 K/UL (ref 0–0.2)
BASOPHILS NFR BLD: 0.3 % (ref 0–1.9)
BILIRUB SERPL-MCNC: 0.3 MG/DL (ref 0.1–1)
BILIRUB UR QL STRIP: NEGATIVE
BUN SERPL-MCNC: 7 MG/DL (ref 6–20)
CALCIUM SERPL-MCNC: 9.2 MG/DL (ref 8.7–10.5)
CHLORIDE SERPL-SCNC: 107 MMOL/L (ref 95–110)
CLARITY UR: CLEAR
CO2 SERPL-SCNC: 22 MMOL/L (ref 23–29)
COLOR UR: YELLOW
CREAT SERPL-MCNC: 0.6 MG/DL (ref 0.5–1.4)
CRP SERPL-MCNC: 38.4 MG/L (ref 0–8.2)
CTP QC/QA: YES
CTP QC/QA: YES
D DIMER PPP IA.FEU-MCNC: 0.77 MG/L FEU
DIFFERENTIAL METHOD: ABNORMAL
EOSINOPHIL # BLD AUTO: 0.4 K/UL (ref 0–0.5)
EOSINOPHIL NFR BLD: 3.4 % (ref 0–8)
ERYTHROCYTE [DISTWIDTH] IN BLOOD BY AUTOMATED COUNT: 13.2 % (ref 11.5–14.5)
ERYTHROCYTE [SEDIMENTATION RATE] IN BLOOD BY WESTERGREN METHOD: 20 MM/HR (ref 0–20)
EST. GFR  (AFRICAN AMERICAN): >60 ML/MIN/1.73 M^2
EST. GFR  (NON AFRICAN AMERICAN): >60 ML/MIN/1.73 M^2
GLUCOSE SERPL-MCNC: 85 MG/DL (ref 70–110)
GLUCOSE UR QL STRIP: NEGATIVE
HCG INTACT+B SERPL-ACNC: NORMAL MIU/ML
HCT VFR BLD AUTO: 39.6 % (ref 37–48.5)
HGB BLD-MCNC: 13.2 G/DL (ref 12–16)
HGB UR QL STRIP: NEGATIVE
IMM GRANULOCYTES # BLD AUTO: 0.04 K/UL (ref 0–0.04)
IMM GRANULOCYTES NFR BLD AUTO: 0.3 % (ref 0–0.5)
KETONES UR QL STRIP: NEGATIVE
LEUKOCYTE ESTERASE UR QL STRIP: NEGATIVE
LYMPHOCYTES # BLD AUTO: 2.2 K/UL (ref 1–4.8)
LYMPHOCYTES NFR BLD: 16.6 % (ref 18–48)
MCH RBC QN AUTO: 29.9 PG (ref 27–31)
MCHC RBC AUTO-ENTMCNC: 33.3 G/DL (ref 32–36)
MCV RBC AUTO: 90 FL (ref 82–98)
MONOCYTES # BLD AUTO: 0.7 K/UL (ref 0.3–1)
MONOCYTES NFR BLD: 5.6 % (ref 4–15)
NEUTROPHILS # BLD AUTO: 9.5 K/UL (ref 1.8–7.7)
NEUTROPHILS NFR BLD: 73.8 % (ref 38–73)
NITRITE UR QL STRIP: NEGATIVE
NRBC BLD-RTO: 0 /100 WBC
PH UR STRIP: 8 [PH] (ref 5–8)
PLATELET # BLD AUTO: 238 K/UL (ref 150–450)
PMV BLD AUTO: 11.8 FL (ref 9.2–12.9)
POC MOLECULAR INFLUENZA A AGN: NEGATIVE
POC MOLECULAR INFLUENZA B AGN: NEGATIVE
POTASSIUM SERPL-SCNC: 4 MMOL/L (ref 3.5–5.1)
PROT SERPL-MCNC: 7.4 G/DL (ref 6–8.4)
PROT UR QL STRIP: ABNORMAL
RBC # BLD AUTO: 4.41 M/UL (ref 4–5.4)
SODIUM SERPL-SCNC: 140 MMOL/L (ref 136–145)
SP GR UR STRIP: 1.02 (ref 1–1.03)
T4 FREE SERPL-MCNC: 0.97 NG/DL (ref 0.71–1.51)
TSH SERPL DL<=0.005 MIU/L-ACNC: 1.56 UIU/ML (ref 0.4–4)
URN SPEC COLLECT METH UR: ABNORMAL
UROBILINOGEN UR STRIP-ACNC: NEGATIVE EU/DL
WBC # BLD AUTO: 12.93 K/UL (ref 3.9–12.7)

## 2022-01-23 PROCEDURE — 84443 ASSAY THYROID STIM HORMONE: CPT | Performed by: NURSE PRACTITIONER

## 2022-01-23 PROCEDURE — 25000003 PHARM REV CODE 250: Performed by: NURSE PRACTITIONER

## 2022-01-23 PROCEDURE — 93005 ELECTROCARDIOGRAM TRACING: CPT

## 2022-01-23 PROCEDURE — 84439 ASSAY OF FREE THYROXINE: CPT | Performed by: NURSE PRACTITIONER

## 2022-01-23 PROCEDURE — 96360 HYDRATION IV INFUSION INIT: CPT

## 2022-01-23 PROCEDURE — 81003 URINALYSIS AUTO W/O SCOPE: CPT | Performed by: NURSE PRACTITIONER

## 2022-01-23 PROCEDURE — 85025 COMPLETE CBC W/AUTO DIFF WBC: CPT | Performed by: NURSE PRACTITIONER

## 2022-01-23 PROCEDURE — 87502 INFLUENZA DNA AMP PROBE: CPT

## 2022-01-23 PROCEDURE — U0005 INFEC AGEN DETEC AMPLI PROBE: HCPCS | Performed by: EMERGENCY MEDICINE

## 2022-01-23 PROCEDURE — 85379 FIBRIN DEGRADATION QUANT: CPT | Performed by: NURSE PRACTITIONER

## 2022-01-23 PROCEDURE — 99284 EMERGENCY DEPT VISIT MOD MDM: CPT | Mod: 25

## 2022-01-23 PROCEDURE — U0003 INFECTIOUS AGENT DETECTION BY NUCLEIC ACID (DNA OR RNA); SEVERE ACUTE RESPIRATORY SYNDROME CORONAVIRUS 2 (SARS-COV-2) (CORONAVIRUS DISEASE [COVID-19]), AMPLIFIED PROBE TECHNIQUE, MAKING USE OF HIGH THROUGHPUT TECHNOLOGIES AS DESCRIBED BY CMS-2020-01-R: HCPCS | Performed by: EMERGENCY MEDICINE

## 2022-01-23 PROCEDURE — 80053 COMPREHEN METABOLIC PANEL: CPT | Performed by: NURSE PRACTITIONER

## 2022-01-23 PROCEDURE — 81025 URINE PREGNANCY TEST: CPT | Performed by: EMERGENCY MEDICINE

## 2022-01-23 PROCEDURE — 86140 C-REACTIVE PROTEIN: CPT | Performed by: NURSE PRACTITIONER

## 2022-01-23 PROCEDURE — 93010 ELECTROCARDIOGRAM REPORT: CPT | Mod: ,,, | Performed by: INTERNAL MEDICINE

## 2022-01-23 PROCEDURE — 84702 CHORIONIC GONADOTROPIN TEST: CPT | Performed by: NURSE PRACTITIONER

## 2022-01-23 PROCEDURE — 85652 RBC SED RATE AUTOMATED: CPT | Performed by: NURSE PRACTITIONER

## 2022-01-23 PROCEDURE — 93010 EKG 12-LEAD: ICD-10-PCS | Mod: ,,, | Performed by: INTERNAL MEDICINE

## 2022-01-23 RX ADMIN — SODIUM CHLORIDE 1000 ML: 0.9 INJECTION, SOLUTION INTRAVENOUS at 02:01

## 2022-01-23 NOTE — ED PROVIDER NOTES
"Encounter Date: 1/23/2022    SCRIBE #1 NOTE: I, Gayathri Chavez, am scribing for, and in the presence of,  Zach Molina DNP. I have scribed the following portions of the note - Other sections scribed: HPI, ROS, PE.       History     Chief Complaint   Patient presents with    flu like symptoms     Patient reports nausea, vomiting, generalized weakness, fatigue, lightheadedness, sob, headache, and mid sternal chest pressure x 1 week. Denies cough, fevers, chills, sore throat.      Time seen by provider: 2:24 PM    Jolly Collier is a 21 y.o. female, with no pertinent past medical history, who presents to the ED with weakness onset prior to arrival. Patient notes associated symptoms of lightheadedness, fatigue, rash (to face and chest), joint pain, headache, nausea, constipation, chest pain (midsternal, "pressured" in quality), and leg pain (bilateral). She notes that she has had these symptoms intermittently for roughly 1 year, but that they are currently more severe than in the past which prompted her arrival to the ED today. Patient has not attempted Tx. No other exacerbating or alleviating factors. Patient denies other associated symptoms. Patient has attempted to make an appointment with Rheumatology, but was unable to due to lack of referral.       The history is provided by the patient.     Review of patient's allergies indicates:   Allergen Reactions    Amoxicillin     Penicillins      Past Medical History:   Diagnosis Date    Herpes      Past Surgical History:   Procedure Laterality Date    lasix       Family History   Problem Relation Age of Onset    Breast cancer Neg Hx     Colon cancer Neg Hx     Ovarian cancer Neg Hx      Social History     Tobacco Use    Smoking status: Never Smoker    Smokeless tobacco: Never Used   Substance Use Topics    Alcohol use: No    Drug use: No     Review of Systems   Constitutional: Positive for fatigue. Negative for chills and fever.   HENT: Negative " for congestion, rhinorrhea and sore throat.    Eyes: Negative for visual disturbance.   Respiratory: Negative for cough and shortness of breath.    Cardiovascular: Positive for chest pain.   Gastrointestinal: Positive for constipation and nausea. Negative for abdominal pain, diarrhea and vomiting.   Genitourinary: Negative for dysuria, frequency and hematuria.   Musculoskeletal: Positive for joint swelling. Negative for back pain.        Positive for leg pain (bilateral).   Skin: Positive for rash (to face and chest).   Neurological: Positive for weakness, light-headedness and headaches. Negative for dizziness.       Physical Exam     Initial Vitals [01/23/22 1323]   BP Pulse Resp Temp SpO2   (!) 158/99 (!) 120 16 99.3 °F (37.4 °C) 99 %      MAP       --         Physical Exam    Nursing note and vitals reviewed.  Constitutional: She appears well-developed and well-nourished.   HENT:   Head: Normocephalic and atraumatic.   Right Ear: Hearing, tympanic membrane, external ear and ear canal normal.   Left Ear: Hearing, tympanic membrane, external ear and ear canal normal.   Nose: Nose normal. No mucosal edema or rhinorrhea. No epistaxis. Right sinus exhibits no maxillary sinus tenderness and no frontal sinus tenderness. Left sinus exhibits no maxillary sinus tenderness and no frontal sinus tenderness.   Mouth/Throat: Uvula is midline, oropharynx is clear and moist and mucous membranes are normal. No oral lesions. Normal dentition.   Eyes: Conjunctivae and EOM are normal. Pupils are equal, round, and reactive to light. Right eye exhibits no discharge. Left eye exhibits no discharge.   Neck:   Normal range of motion.  Cardiovascular: Regular rhythm, S1 normal, S2 normal and normal heart sounds. Exam reveals no gallop.    No murmur heard.  Pulmonary/Chest: Effort normal and breath sounds normal. No respiratory distress. She has no decreased breath sounds. She has no wheezes. She has no rhonchi. She has no rales.    Abdominal: She exhibits no distension.   Musculoskeletal:         General: Normal range of motion.      Cervical back: Normal range of motion.     Neurological: She is alert and oriented to person, place, and time.   Skin: Skin is dry. Capillary refill takes less than 2 seconds. Rash (Mild redness to the bilateral facial cheeks, area of redness between the breasts.) noted.   Plaque-like rash to area between breasts. Redness to cheeks.         ED Course   Procedures  Labs Reviewed   CBC W/ AUTO DIFFERENTIAL - Abnormal; Notable for the following components:       Result Value    WBC 12.93 (*)     Gran # (ANC) 9.5 (*)     Gran % 73.8 (*)     Lymph % 16.6 (*)     All other components within normal limits   COMPREHENSIVE METABOLIC PANEL - Abnormal; Notable for the following components:    CO2 22 (*)     All other components within normal limits   D DIMER, QUANTITATIVE - Abnormal; Notable for the following components:    D-Dimer 0.77 (*)     All other components within normal limits   URINALYSIS, REFLEX TO URINE CULTURE - Abnormal; Notable for the following components:    Protein, UA Trace (*)     All other components within normal limits    Narrative:     Specimen Source->Urine   C-REACTIVE PROTEIN - Abnormal; Notable for the following components:    CRP 38.4 (*)     All other components within normal limits   POCT URINE PREGNANCY - Abnormal; Notable for the following components:    POC Preg Test, Ur Positive (*)     All other components within normal limits   SEDIMENTATION RATE   TSH   T4, FREE   HCG, QUANTITATIVE    Narrative:     Release to patient->Immediate   SARS-COV-2 (COVID-19) QUALITATIVE PCR   POCT INFLUENZA A/B MOLECULAR          Imaging Results    None          Medications   sodium chloride 0.9% bolus 1,000 mL (0 mLs Intravenous Stopped 1/23/22 5442)     Medical Decision Making:   History:   Old Medical Records: I decided to obtain old medical records.  Initial Assessment:   21-year-old female reports vague  symptoms of muscle cramps, joint pain, fatigue, lightheadedness, chest pressure, rashes.  She reports she is currently breast feeding and had a baby approximately 1 year ago.  On physical exam she is afebrile and nontoxic in no apparent distress breath sounds are clear heart sounds are normal.  There is a mild readiness to bilateral facial cheeks and there is a reddened area between the breasts as she described. She does not appear ill or toxic.  Differential Diagnosis:   Rheumatologic condition, anemia, electrolyte disturbance, thyroid issue  Clinical Tests:   Lab Tests: Ordered and Reviewed  Medical Tests: Ordered and Reviewed          Scribe Attestation:   Scribe #1: I performed the above scribed service and the documentation accurately describes the services I performed. I attest to the accuracy of the note.        ED Course as of 01/23/22 1952   Sun Jan 23, 2022   1407 BP(!): 158/99 [VC]   1407 Temp: 99.3 °F (37.4 °C) [VC]   1407 Temp src: Oral [VC]   1407 Pulse(!): 120 [VC]   1407 Resp: 16 [VC]   1407 SpO2: 99 % [VC]   1424 INDEPENDENT EKG INTERPRETATION: 109 ST, no ectopy.   V1 flipped t wave otherwise normal. [VC]   1455 Preg Test, Ur(!): Positive [VC]   1455  Acceptable: Yes [VC]   1455 BP: 113/60 [VC]   1455 Pulse: 94 [VC]   1455 Resp: 17 [VC]   1455 SpO2: 99 % [VC]   1511 BP(!): 110/58 [VC]   1511 BP: 110/62 [VC]   1511 Pulse: 88 [VC]   1519 CBC auto differential(!)  Mild elevation of the wbc. Normal h/h and platelet count. [VC]   1520 Urinalysis, Reflex to Urine Culture Urine, Clean Catch(!)  Neg uti. [VC]   1520 BP: 109/62 [VC]   1557 CRP(!): 38.4 [VC]   1557 Comprehensive metabolic panel(!)  Normal cmp. [VC]   1600 D-Dimer(!): 0.77  PE ruled out by YEARS algorithm [VC]   1613 Free T4: 0.97 [VC]   1613 TSH: 1.561 [VC]   1613 HCG Quant: 71627 [VC]      ED Course User Index  [VC] Zach Molina, JEFF          the patient was not previously aware of her pregnancy.  It does answer most of  her complaints.  The rashes are likely chloasma.  She is discharged home in good condition to follow-up as prescribed.  See AVS for additional recommendations. Medications listed below were prescribed after reviewing the patient's allergies, medication list, history, most recent laboratories as available.  Referrals below were provided after reviewing the patient's previous medical providers. She understands she  should return for any worsening or changes in condition.  Prior to discharge the patient was asked if she  had any additional concerns or complaints and she declined. The patient was given an opportunity to ask questions and all were answered to her satisfaction.    Medications given in the ER During this Visit:  Medications   sodium chloride 0.9% bolus 1,000 mL (0 mLs Intravenous Stopped 1/23/22 1552)         Clinical Impression:   Final diagnoses:  [R07.9] Chest pain  [Z3A.08] 8 weeks gestation of pregnancy (Primary)  [R09.1] Pleurisy       Zach MARCUS DNP ACNP-BC FNP-C ENP-C  , personally performed the services described in this documentation. All medical record entries made by the scribe were at my direction and in my presence. I have reviewed the chart and agree that the record reflects my personal performance and is accurate and complete.     ED Disposition Condition    Discharge Stable        ED Prescriptions     None        Follow-up Information     Follow up With Specialties Details Why Contact Info    Jenn Rosen MD Obstetrics and Gynecology Schedule an appointment as soon as possible for a visit   [address] [place] [id number]  [phone]             Zach Molina DNP  01/23/22 1952

## 2022-01-23 NOTE — DISCHARGE INSTRUCTIONS
May use tylenol for chest pain. Moist heat may also be used. Honey for cough. Push fluids.  The rash on face is a normal part of pregnancy called Chloasma. Return to the Emergency Department for any worsening, change in condition, or any emergent concerns.

## 2022-01-25 LAB
SARS-COV-2 RNA RESP QL NAA+PROBE: NOT DETECTED
SARS-COV-2- CYCLE NUMBER: NORMAL

## 2022-01-27 ENCOUNTER — INITIAL PRENATAL (OUTPATIENT)
Dept: OBSTETRICS AND GYNECOLOGY | Facility: CLINIC | Age: 22
End: 2022-01-27
Payer: MEDICAID

## 2022-01-27 VITALS
SYSTOLIC BLOOD PRESSURE: 118 MMHG | WEIGHT: 162.56 LBS | HEIGHT: 61 IN | DIASTOLIC BLOOD PRESSURE: 60 MMHG | BODY MASS INDEX: 30.69 KG/M2

## 2022-01-27 DIAGNOSIS — Z87.798 HISTORY OF CONGENITAL ABNORMALITY: Primary | ICD-10-CM

## 2022-01-27 DIAGNOSIS — Z34.90 PREGNANCY WITH ONE FETUS, ANTEPARTUM: ICD-10-CM

## 2022-01-27 PROCEDURE — 87086 URINE CULTURE/COLONY COUNT: CPT | Performed by: ADVANCED PRACTICE MIDWIFE

## 2022-01-27 PROCEDURE — 99203 PR OFFICE/OUTPT VISIT, NEW, LEVL III, 30-44 MIN: ICD-10-PCS | Mod: TH,S$PBB,, | Performed by: ADVANCED PRACTICE MIDWIFE

## 2022-01-27 PROCEDURE — 99213 OFFICE O/P EST LOW 20 MIN: CPT | Mod: PBBFAC,TH | Performed by: ADVANCED PRACTICE MIDWIFE

## 2022-01-27 PROCEDURE — 99999 PR PBB SHADOW E&M-EST. PATIENT-LVL III: CPT | Mod: PBBFAC,,, | Performed by: ADVANCED PRACTICE MIDWIFE

## 2022-01-27 PROCEDURE — 99999 PR PBB SHADOW E&M-EST. PATIENT-LVL III: ICD-10-PCS | Mod: PBBFAC,,, | Performed by: ADVANCED PRACTICE MIDWIFE

## 2022-01-27 PROCEDURE — 99203 OFFICE O/P NEW LOW 30 MIN: CPT | Mod: TH,S$PBB,, | Performed by: ADVANCED PRACTICE MIDWIFE

## 2022-01-27 NOTE — PROGRESS NOTES
"Jolly Collier is a 21 y.o. , presents today for amenorrhea.      C/C: amenorrhea  She went to the ED because she thought she was sick, throwing up- "sick" for 2 weeks. Went to ED on 22. Didn't know she was pregnant.     HPI: Reports amenorrhea sinceLMP- 20 bled for one day, 10/9/22 last reg mense. Neg preg test in Nov. Prior to LMP, menses were starting to regulate after last baby.  She is not currently on any contraception. + UPT on 22. Also reports nausea and vomiting, past 2 weeks. Has noticed breast tenderness, still breastfeeding. Denies vaginal bleeding since LMP. Wants genetic screening.    SOCIAL HISTORY: Denies emotional/mental/physical/sexual violence or abuse. Feels safe at home.       Is not taking Adderall since before she was pregnant    PAP HISTORY: last pap , result NILM with out HPV, denies any history of abnormal pap smear or STDs.     Reports no long-term chronic medical conditions     Review of patient's allergies indicates:   Allergen Reactions    Amoxicillin     Penicillins      Past Medical History:   Diagnosis Date    Herpes      Past Surgical History:   Procedure Laterality Date    lasix       Past Surgical History:   Procedure Laterality Date    lasix       OB History    Para Term  AB Living   3 2 2 0 1 2   SAB IAB Ectopic Multiple Live Births   0 1 0 0 2      # Outcome Date GA Lbr Bean/2nd Weight Sex Delivery Anes PTL Lv   3 Term 21 39w1d / 00:05 4 kg (8 lb 13.1 oz) M Vag-Spont None N LUKASZ   2 IAB 2020           1 Term 18 37w0d  3.487 kg (7 lb 11 oz) F Vag-Spont EPI  LUKASZ     OB History        3    Para   2    Term   2       0    AB   1    Living   2       SAB   0    IAB   1    Ectopic   0    Multiple   0    Live Births   2               Social History     Socioeconomic History    Marital status: Single   Tobacco Use    Smoking status: Never Smoker    Smokeless tobacco: Never Used   Substance and " Sexual Activity    Alcohol use: No    Drug use: No    Sexual activity: Yes     Partners: Male     Birth control/protection: None   Social History Narrative    Menarche at 12     Family History   Problem Relation Age of Onset    Breast cancer Neg Hx     Colon cancer Neg Hx     Ovarian cancer Neg Hx      Social History     Substance and Sexual Activity   Sexual Activity Yes    Partners: Male    Birth control/protection: None       GENETIC SCREENING   Patient's age 35 years or older as of estimated date of delivery? n  Neural tube defect (meningomyelocele, spina bifida, or anencephaly)? n  Down syndrome? n  Niels-Sachs (Ashkenazi Oriental orthodox, Cajun, Urdu South Korean)? n  Canavan disease (Ashkenazi Oriental orthodox)? n  Familial dysautonomia (Ashkenazi Oriental orthodox)? n  Sickle cell disease or trait ()? n  Hemophilia or other blood disorders? n  Cystic fibrosis? n  Muscular dystrophy? n  Carroll's chorea? n  Thalassemia (Italian, Greek, Mediterranean, or  background) MCV less than 80? n  Congenital heart defect? n  Mental retardation/autism? n/n   If Yes, was person tested for Fragile X? n  Other inherited genetic or chromosomal disorder? n  Maternal metabolic disorder (e.g. type 1 diabetes, PKU)? n  Patient or baby's father had a child with birth defects not listed above? Yes, MD, cousin's son, and heart defect- brother's daughter, autism his cousin  Recurrent pregnancy loss or a stillbirth: n  Medications (including supplements, vitamins, herbs or OTC drugs)/illicit/recreational drugs/alcohol since last menstrual period? PNV   If yes, agent(s) and strength/dose: see meds  List any other genetic risks: n  Comments/counseling: n    INFECTION HISTORY  Live with someone with TB or exposed to TB: n  Patient or partner has history of genital herpes: yes  Rash or viral illness since last menstrual period: n  Patient or partner has hepatitis B or C: n  History of STD, gonorrhea, chlamydia, HPV, HIV, syphilis (list all that  apply): n  List other infections: n  Additional comments: n        ROS:  wnl  Constitutional/Gen: Denies fevers, chills, malaise, or weight loss. Some fatigue   Psych: Denies depression, anxiety  Eyes: Denies changes in vision or scotomata  Ears, nose, mouth, throat: Denies sinus tenderness, swelling, or dentition problems  CV/vasc: Denies heart palpitations or edema  Resp: Denies SOB or dyspnea  Breasts: Denies mass, nipple discharge, or trauma. Some breast tenderness.  GI: Denies constipation, diarrhea, or vomiting. Some nausea.  : Denies vaginal discharge, dysuria or pelvic pain. Some urinary frequency  MS: Denies weakness, soreness, or changes in ROM    OBJECTIVE:  There were no vitals taken for this visit.  Constitutional/Gen: NAD, appears stated age, well groomed  Neck: supple, no masses or enlargement  Head: normocephalic  Skin: warm and dry w/o rash  Lung: normal resp effort, CTAB  Heart: normal HR, RRR   Back: negative CVAT  Breasts: bilaterally--no masses, tenderness, skin changes, or nipple discharge noted  Abdomen: soft, nontender, no masses, and bowel sounds normal, no enlargement  External genitalia: no lesions or discharge, normal hair distribution  Urethral meatus: normal size and location, no lesions or prolapse  Vagina: normal appearance, no lesions, no discharge, no evidence cystocele or rectocele.  Cervix: normal appearance, no discharge, no lesions, negative CMT  Uterus: nontender, mobile, approx 8-9 week size, contour, and position. Too early for FHTs via doppler  Adnexa: no masses or tenderness  Anus/Perineum: normal appearance, with no lesions or discharge. Internal exam deferred.  Extremities: FROM, with no edema or tenderness.  Neurologic: A&O x 4, non-focal, cranial nerves 2-12 grossly intact  Psych: affect appropriate and without signs of mood, thought or memory difficulty appreciated      UPT + in office    ASSESSMENT:  21 y.o. female  with amenorrhea  Likely at 11w0d via desean  LMP; S<D  There is no height or weight on file to calculate BMI.  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 Amenorrhea  -- + UPT in office, No LMP recorded. --> Estimated Date of Delivery: None noted.  -- Dating US ordered  -- Anatomical US ordered  -- Routine serum and urine prenatal labs today    2. Physical exam today. Discussed ASCCP guidelines. Last pap 11/2021. Pap not needed today/ next pap due not later than 11/2024.     3. BMI 30  -- Discussed IOM recommended weight gain of:   Weight category Pre pre BMI  Recommended TWG   Underweight Less than 18.5 28-40    Normal Weight 18.5-24.9  25-35    Overweight 25-29.9  15-25    Obese   30 and greater  11-20   -- Discussed criteria for delivery at [facility] r/t excessive pre-preg weight or excessive weight gain:   Pre-pregnancy BMI over 40 or excess pregnancy weight gain defined as:   Pre-preg BMI < 18.5; Excess weight gain = > 60 pound   Pre-preg BMI 18.5-24.9;  Excess weight gain = > 53 pounds   Pre-preg BMI 25-29.9;  Excess weight gain = > 38 pounds   Pre-preg BMI > 30;  Excess weight gain = > 30 pounds    4. Discussed nausea and vomiting in pregnancy  -- Education regarding lifestyle and dietary modifications  -- Reviewed use of B6/Unisom prn. Pt will notify us if no relief/worsening symptoms, will consider alternative therapies prn    5. Pregnancy education and couseling; handouts and booklet provided  -- Oriented to practice and anticipated prenatal course of care and how to contact us  -- Reviewed ABC guidelines and admission, exclusion, and transfer criteria  -- Precautions/warning signs reviewed  -- Common complaints of pregnancy  -- Routine prenatal labs including HIV  -- Ultrasounds  -- Childbirth education/hospital/[facility] facilities  -- Nutrition, prepregnant BMI, and recommended weight gain  -- Toxoplasmosis precautions (Cats/Raw Meat)  -- Sexual  activity and exercise  -- Environmental/Work hazards  -- Travel  -- Tobacco (Ask, Advise, Assess, Assist, and Arrange), as well as alcohol and drug use  -- Use of any medications (Including supplements, Vitamins, Herbs, or OTC Drugs)  -- Domestic violence screen neg    6. Reviewed genetic testing options. Reviewed available first trimester and/or second  trimester screening options. Reviewed risk of false positive/negative results and recommendation of referral to [facility] in event of a positive result, for NIPT, US, and/or amniocentesis. Reviewed the possible estimated 1 in 300/500 risk of miscarriage with amniocentesis, even with a healthy fetus. Discussed genetic screening, and patient does want genetic screening.  Quad screen ordered, awaiting U/S for accurate dates.    7. Any additional problems, none (problems)    8. Reviewed warning signs, precautions, and how/when to contact us.     9. RTC x 4 weeks, call or present sooner prn.     Wendy Gerhardt CNM/MAYNOR

## 2022-01-28 ENCOUNTER — PROCEDURE VISIT (OUTPATIENT)
Dept: OBSTETRICS AND GYNECOLOGY | Facility: CLINIC | Age: 22
End: 2022-01-28
Payer: MEDICAID

## 2022-01-28 DIAGNOSIS — Z34.90 PREGNANCY WITH ONE FETUS, ANTEPARTUM: ICD-10-CM

## 2022-01-28 LAB — BACTERIA UR CULT: NO GROWTH

## 2022-01-28 PROCEDURE — 76801 US OB/GYN PROCEDURE (VIEWPOINT): ICD-10-PCS | Mod: 26,S$PBB,, | Performed by: OBSTETRICS & GYNECOLOGY

## 2022-01-28 PROCEDURE — 76801 OB US < 14 WKS SINGLE FETUS: CPT | Mod: PBBFAC | Performed by: OBSTETRICS & GYNECOLOGY

## 2022-03-02 ENCOUNTER — OFFICE VISIT (OUTPATIENT)
Dept: OBSTETRICS AND GYNECOLOGY | Facility: CLINIC | Age: 22
End: 2022-03-02
Payer: MEDICAID

## 2022-03-02 DIAGNOSIS — Z34.81 ENCOUNTER FOR SUPERVISION OF OTHER NORMAL PREGNANCY IN FIRST TRIMESTER: Primary | ICD-10-CM

## 2022-03-02 DIAGNOSIS — Z34.90 PREGNANCY, UNSPECIFIED GESTATIONAL AGE: ICD-10-CM

## 2022-03-02 DIAGNOSIS — R30.0 DYSURIA: ICD-10-CM

## 2022-03-02 PROCEDURE — 99214 OFFICE O/P EST MOD 30 MIN: CPT | Mod: TH,95,, | Performed by: MIDWIFE

## 2022-03-02 PROCEDURE — 99214 PR OFFICE/OUTPT VISIT, EST, LEVL IV, 30-39 MIN: ICD-10-PCS | Mod: TH,95,, | Performed by: MIDWIFE

## 2022-03-02 RX ORDER — NITROFURANTOIN 25; 75 MG/1; MG/1
100 CAPSULE ORAL 2 TIMES DAILY
Qty: 14 CAPSULE | Refills: 0 | Status: SHIPPED | OUTPATIENT
Start: 2022-03-02 | End: 2022-03-09

## 2022-03-02 NOTE — PROGRESS NOTES
The patient location is: [place], [place]  The chief complaint leading to consultation is: initial OB    Visit type: audiovisual    Face to Face time with patient: 15  25 minutes of total time spent on the encounter, which includes face to face time and non-face to face time preparing to see the patient (eg, review of tests), Obtaining and/or reviewing separately obtained history, Documenting clinical information in the electronic or other health record, Independently interpreting results (not separately reported) and communicating results to the patient/family/caregiver, or Care coordination (not separately reported).         Each patient to whom he or she provides medical services by telemedicine is:  (1) informed of the relationship between the physician and patient and the respective role of any other health care provider with respect to management of the patient; and (2) notified that he or she may decline to receive medical services by telemedicine and may withdraw from such care at any time.    Notes:     Chief Complaint   Patient presents with    Routine Prenatal Visit       21 y.o.,  at 11w6d by 7 week US    Patient presents today for initial prenatal visit. Reports good support at home.    Complaints today: Affirms fatigue. Doing well overall.     Last pap: DUE for pap- denies hx abnormals. Will make note and plan at next [facility]    Medical history: None    Current medications: prenatal visit, vitamin b6    ROS  OBSTETRICS:   Contractions No   Bleeding No   Loss of fluid No    GASTRO:   Nausea No   Vomiting No      OB History    Para Term  AB Living   4 2 2 0 1 2   SAB IAB Ectopic Multiple Live Births   0 1 0 0 2      # Outcome Date GA Lbr Bean/2nd Weight Sex Delivery Anes PTL Lv   4 Current            3 Term 21 39w1d / 00:05 4 kg (8 lb 13.1 oz) M Vag-Spont None N LUKASZ   2 IAB 2020           1 Term 18 37w0d  3.487 kg (7 lb 11 oz) F Vag-Spont EPI  LUKASZ      Obstetric Comments    Labors: 11 hours & 2 hours       Allergies and problem list reviewed and updated  Medical and surgical history reviewed    PHYSICAL EXAM  LMP 2021 (Approximate)     GENERAL: No acute distress  HEENT: Normocephalic, atruamatic  NEURO: Alert and oriented x3  PSYCH: Calm, normal mood and affect  PULMONARY: Non-labored respiration; no tachypnea  ABD: Soft, gravid, nontender    ASSESSMENT AND PLAN     Problems (from 21 to present)     No problems associated with this episode.            US reviewed.  Urine culture negative. Has not had prenatal labs collected yet. Discussed. Ucx added d/t symptoms. Will treat empirically. Patient to get urine culture collected at [facility] this week for adjustment of treatment   Rx Macrobid 100 mg BID  Counseled today on proper weight gain based on the [place] [employer]'s recommendations based on her pre-pregnancy weight. Discussed excessive weight gain allowance, which would risk her out of the ABC (and would plan for delivery on L&D), but not midwifery care. Reviewed potential risks to excessive weight gain.  .BMI (prepregnancy)  -- Discussed IOM recommended weight gain of:   Weight category Pre pre BMI  Recommended TWG   Underweight Less than 18.5 28-40    Normal Weight 18.5-24.9  25-35    Overweight 25-29.9  15-25    Obese   30 and greater  11-20   -- Discussed criteria for delivery at ABC r/t excessive pre-preg weight or excessive weight gain:   Pre-pregnancy BMI over 40 or excess pregnancy weight gain defined as:   Pre-preg BMI < 18.5; Excess weight gain = > 60 pound   Pre-preg BMI 18.5-24.9;  Excess weight gain = > 53 pounds   Pre-preg BMI 25-29.9;  Excess weight gain = > 38 pounds   Pre-preg BMI > 30;  Excess weight gain = > 30 pounds    Review exercise precautions in pregnancy, along with recommendations. She does not exercise regularly-- feeling fatigue and persistent nausea/vomiting. Advised minimum of 150 min of moderate activity each week.    Discussed importance of nutrition in pregnancy.   Discussed substances & foods to avoid in pregnancy (i.e. sushi, fish that are high in mercury, deli meat, and unpasteurized cheeses).   Discussed prenatal vitamin options (i.e. stool softener, DHA).    Patient was oriented to practice and progression of routine prenatal care. Patient has not yet completed a Meet & Greet tour of [facility].  Reviewed New OB Pregnancy packet & questions answered.    Reviewed aneuploidy screening options and indications, questions answered - patient interested in integrated vs quad screen.   Education regarding warning signs.      Follow up: 4 wks, call or present sooner prn.     Jose Armando Maurer CNM

## 2022-03-03 ENCOUNTER — PATIENT MESSAGE (OUTPATIENT)
Dept: ADMINISTRATIVE | Facility: OTHER | Age: 22
End: 2022-03-03
Payer: MEDICAID

## 2022-03-10 ENCOUNTER — PATIENT MESSAGE (OUTPATIENT)
Dept: ADMINISTRATIVE | Facility: OTHER | Age: 22
End: 2022-03-10
Payer: MEDICAID

## 2022-03-24 ENCOUNTER — ROUTINE PRENATAL (OUTPATIENT)
Dept: OBSTETRICS AND GYNECOLOGY | Facility: CLINIC | Age: 22
End: 2022-03-24
Payer: MEDICAID

## 2022-03-24 ENCOUNTER — PATIENT MESSAGE (OUTPATIENT)
Dept: OBSTETRICS AND GYNECOLOGY | Facility: CLINIC | Age: 22
End: 2022-03-24

## 2022-03-24 VITALS — DIASTOLIC BLOOD PRESSURE: 70 MMHG | WEIGHT: 154 LBS | SYSTOLIC BLOOD PRESSURE: 116 MMHG | BODY MASS INDEX: 29.11 KG/M2

## 2022-03-24 DIAGNOSIS — O26.899 ABDOMINAL CRAMPING AFFECTING PREGNANCY: Primary | ICD-10-CM

## 2022-03-24 DIAGNOSIS — Z34.92 PRENATAL CARE IN SECOND TRIMESTER: ICD-10-CM

## 2022-03-24 DIAGNOSIS — R10.9 ABDOMINAL CRAMPING AFFECTING PREGNANCY: Primary | ICD-10-CM

## 2022-03-24 PROCEDURE — 99999 PR PBB SHADOW E&M-EST. PATIENT-LVL II: ICD-10-PCS | Mod: PBBFAC,,, | Performed by: ADVANCED PRACTICE MIDWIFE

## 2022-03-24 PROCEDURE — 99999 PR PBB SHADOW E&M-EST. PATIENT-LVL II: CPT | Mod: PBBFAC,,, | Performed by: ADVANCED PRACTICE MIDWIFE

## 2022-03-24 PROCEDURE — 99215 OFFICE O/P EST HI 40 MIN: CPT | Mod: S$PBB,TH,, | Performed by: ADVANCED PRACTICE MIDWIFE

## 2022-03-24 PROCEDURE — 87086 URINE CULTURE/COLONY COUNT: CPT | Performed by: ADVANCED PRACTICE MIDWIFE

## 2022-03-24 PROCEDURE — 99212 OFFICE O/P EST SF 10 MIN: CPT | Mod: PBBFAC,TH | Performed by: ADVANCED PRACTICE MIDWIFE

## 2022-03-24 PROCEDURE — 99215 PR OFFICE/OUTPT VISIT, EST, LEVL V, 40-54 MIN: ICD-10-PCS | Mod: S$PBB,TH,, | Performed by: ADVANCED PRACTICE MIDWIFE

## 2022-03-24 RX ORDER — POLYETHYLENE GLYCOL 3350 17 G/17G
17 POWDER, FOR SOLUTION ORAL DAILY
Qty: 1530 G | Refills: 1 | Status: SHIPPED | OUTPATIENT
Start: 2022-03-24 | End: 2022-06-22

## 2022-03-24 RX ORDER — BUTALBITAL, ACETAMINOPHEN AND CAFFEINE 50; 325; 40 MG/1; MG/1; MG/1
1 TABLET ORAL EVERY 4 HOURS PRN
Qty: 30 TABLET | Refills: 0 | Status: SHIPPED | OUTPATIENT
Start: 2022-03-24 | End: 2022-04-23

## 2022-03-24 NOTE — PROGRESS NOTES
21 y.o.,  at 15w0d by 7 week sonogram presents for workin PNV due to cramping, headaches.     Complaints today: Cramping all day today, denies lof, vb, pain or burning with urination, denies vaginal discharge , itching burning or odor. Hx of term vaginal deliveries with last two pregnancies. HA for several weeks now that are daily sometimes relief with tylenol but not most of the time. Frontal head pain, nausea and vomited this am and one episode of loose stool /diarrhea. Otherwise states she has felt constipated--has not had normal BM for several days--small hard poop other than loose / diarrhea x 1 this am.   Feels she has to poop and not fully emptying.  Cramping is generalized lower abdominal pain and some back cramping.  Able to eat and drink normally today.         ROS  OBSTETRICS:   Contractions No   Bleeding No   Loss of fluid No    GASTRO:   Nausea YES   Vomiting YES x 1      OB History    Para Term  AB Living   4 2 2 0 1 2   SAB IAB Ectopic Multiple Live Births   0 1 0 0 2      # Outcome Date GA Lbr Bean/2nd Weight Sex Delivery Anes PTL Lv   4 Current            3 Term 21 39w1d / 00:05 4 kg (8 lb 13.1 oz) M Vag-Spont None N LUKASZ   2 IAB 2020           1 Term 18 37w0d  3.487 kg (7 lb 11 oz) F Vag-Spont EPI  LUKASZ      Obstetric Comments   Labors: 11 hours & 2 hours       Dating reviewed  Allergies and problem list reviewed and updated  Medical and surgical history reviewed  Prenatal labs reviewed and updated    PHYSICAL EXAM  /70   Wt 69.8 kg (153 lb 15.9 oz)   LMP 2021 (Approximate)   BMI 29.11 kg/m²     GENERAL: No acute distress  HEENT: Normocephalic, atruamatic  NEURO: Alert and oriented x3  PSYCH: Calm, normal mood and affect  PULMONARY: Non-labored respiration; no tachypnea  ABD: Soft, gravid, nontender + FHT's per doppler 150bpm  FH =  between PS & umbilicus    ASSESSMENT AND PLAN     Problems (from 21 to present)     Problem Noted  Resolved    Pregnancy 3/2/2022 by Jose Armando Maurer CNM No    Overview Signed 3/2/2022  4:45 PM by Jose Armando Maurer CNM     Prepregnancy BMI: 26  Pap: 2018 NILM, DUE   Dating - by 7 week sonogram  U/S -  Aneuploidy screening -  Blood type: O POS. Rhogam: not indicated  GDM screen -   Vaccines - [ ]Flu [ ]TDAP  GBS  [ ]Consents  Contraception -  Peds -   Circ -   COVID vax-                      Pt desires aneuploidy screening and further discussed today. Quad screen to be ordered and drawn next week.    Anatomy ultrasound with MFM ordered/discussed.    Cramping: UA normal, Urine culture sent, deferred vaginal cultures.   Discussion about management of constipation and bowel habits. RX miralax--can use glycerine supp to evacuate rectum.   Diet and hydration discussed at length.  Rx fioricet for headaches--use only when needed.   Reassurance and strict precautions reviewed.    ON CALL cnm and practice contact information sent.    Reviewed warning signs and pregnancy precautions, along with how/when to call.  Follow up: scheduled visit , call or present sooner prn.     I spent a total of 45 minutes on the day of the visit.This includes face to face time and non-face to face time preparing to see the patient (eg, review of tests), Obtaining and/or reviewing separately obtained history, Documenting clinical information in the electronic or other health record, Independently interpreting resultsand communicating results to the patient/family/caregiver, or Care coordination.     Hue Alicia CNM, MSN  04/29/2022  1:26 PM      Hue Alicia CNM, MSN  03/24/2022  1:39 PM

## 2022-03-25 ENCOUNTER — PATIENT MESSAGE (OUTPATIENT)
Dept: MATERNAL FETAL MEDICINE | Facility: CLINIC | Age: 22
End: 2022-03-25
Payer: MEDICAID

## 2022-03-25 LAB — BACTERIA UR CULT: NO GROWTH

## 2022-03-28 ENCOUNTER — TELEPHONE (OUTPATIENT)
Dept: OBSTETRICS AND GYNECOLOGY | Facility: HOSPITAL | Age: 22
End: 2022-03-28
Payer: MEDICAID

## 2022-04-04 ENCOUNTER — ROUTINE PRENATAL (OUTPATIENT)
Dept: OBSTETRICS AND GYNECOLOGY | Facility: CLINIC | Age: 22
End: 2022-04-04
Payer: MEDICAID

## 2022-04-04 ENCOUNTER — LAB VISIT (OUTPATIENT)
Dept: LAB | Facility: OTHER | Age: 22
End: 2022-04-04
Attending: ADVANCED PRACTICE MIDWIFE
Payer: MEDICAID

## 2022-04-04 VITALS
WEIGHT: 153.25 LBS | SYSTOLIC BLOOD PRESSURE: 116 MMHG | DIASTOLIC BLOOD PRESSURE: 62 MMHG | BODY MASS INDEX: 28.97 KG/M2

## 2022-04-04 DIAGNOSIS — Z34.90 PREGNANCY WITH ONE FETUS, ANTEPARTUM: ICD-10-CM

## 2022-04-04 DIAGNOSIS — Z34.90 PREGNANCY WITH ONE FETUS, ANTEPARTUM: Primary | ICD-10-CM

## 2022-04-04 LAB
ABO + RH BLD: NORMAL
ANION GAP SERPL CALC-SCNC: 13 MMOL/L (ref 8–16)
BASOPHILS # BLD AUTO: 0.05 K/UL (ref 0–0.2)
BASOPHILS NFR BLD: 0.3 % (ref 0–1.9)
BLD GP AB SCN CELLS X3 SERPL QL: NORMAL
BUN SERPL-MCNC: 8 MG/DL (ref 6–20)
CALCIUM SERPL-MCNC: 10.2 MG/DL (ref 8.7–10.5)
CHLORIDE SERPL-SCNC: 104 MMOL/L (ref 95–110)
CO2 SERPL-SCNC: 21 MMOL/L (ref 23–29)
CREAT SERPL-MCNC: 0.6 MG/DL (ref 0.5–1.4)
DIFFERENTIAL METHOD: ABNORMAL
EOSINOPHIL # BLD AUTO: 0.4 K/UL (ref 0–0.5)
EOSINOPHIL NFR BLD: 2.4 % (ref 0–8)
ERYTHROCYTE [DISTWIDTH] IN BLOOD BY AUTOMATED COUNT: 12.5 % (ref 11.5–14.5)
EST. GFR  (AFRICAN AMERICAN): >60 ML/MIN/1.73 M^2
EST. GFR  (NON AFRICAN AMERICAN): >60 ML/MIN/1.73 M^2
GLUCOSE SERPL-MCNC: 92 MG/DL (ref 70–110)
HCT VFR BLD AUTO: 39 % (ref 37–48.5)
HGB BLD-MCNC: 13.4 G/DL (ref 12–16)
IMM GRANULOCYTES # BLD AUTO: 0.05 K/UL (ref 0–0.04)
IMM GRANULOCYTES NFR BLD AUTO: 0.3 % (ref 0–0.5)
LYMPHOCYTES # BLD AUTO: 2.5 K/UL (ref 1–4.8)
LYMPHOCYTES NFR BLD: 17.1 % (ref 18–48)
MCH RBC QN AUTO: 30.9 PG (ref 27–31)
MCHC RBC AUTO-ENTMCNC: 34.4 G/DL (ref 32–36)
MCV RBC AUTO: 90 FL (ref 82–98)
MONOCYTES # BLD AUTO: 0.5 K/UL (ref 0.3–1)
MONOCYTES NFR BLD: 3.6 % (ref 4–15)
NEUTROPHILS # BLD AUTO: 11 K/UL (ref 1.8–7.7)
NEUTROPHILS NFR BLD: 76.3 % (ref 38–73)
NRBC BLD-RTO: 0 /100 WBC
PLATELET # BLD AUTO: 312 K/UL (ref 150–450)
PMV BLD AUTO: 11.2 FL (ref 9.2–12.9)
POTASSIUM SERPL-SCNC: 4 MMOL/L (ref 3.5–5.1)
RBC # BLD AUTO: 4.34 M/UL (ref 4–5.4)
SODIUM SERPL-SCNC: 138 MMOL/L (ref 136–145)
TSH SERPL DL<=0.005 MIU/L-ACNC: 2.79 UIU/ML (ref 0.4–4)
WBC # BLD AUTO: 14.41 K/UL (ref 3.9–12.7)

## 2022-04-04 PROCEDURE — 36415 COLL VENOUS BLD VENIPUNCTURE: CPT | Performed by: ADVANCED PRACTICE MIDWIFE

## 2022-04-04 PROCEDURE — 87340 HEPATITIS B SURFACE AG IA: CPT | Performed by: ADVANCED PRACTICE MIDWIFE

## 2022-04-04 PROCEDURE — 86592 SYPHILIS TEST NON-TREP QUAL: CPT | Performed by: ADVANCED PRACTICE MIDWIFE

## 2022-04-04 PROCEDURE — 87389 HIV-1 AG W/HIV-1&-2 AB AG IA: CPT | Performed by: ADVANCED PRACTICE MIDWIFE

## 2022-04-04 PROCEDURE — 99213 PR OFFICE/OUTPT VISIT, EST, LEVL III, 20-29 MIN: ICD-10-PCS | Mod: TH,S$PBB,, | Performed by: ADVANCED PRACTICE MIDWIFE

## 2022-04-04 PROCEDURE — 86901 BLOOD TYPING SEROLOGIC RH(D): CPT | Performed by: ADVANCED PRACTICE MIDWIFE

## 2022-04-04 PROCEDURE — 80048 BASIC METABOLIC PNL TOTAL CA: CPT | Performed by: ADVANCED PRACTICE MIDWIFE

## 2022-04-04 PROCEDURE — 86762 RUBELLA ANTIBODY: CPT | Performed by: ADVANCED PRACTICE MIDWIFE

## 2022-04-04 PROCEDURE — 86803 HEPATITIS C AB TEST: CPT | Performed by: ADVANCED PRACTICE MIDWIFE

## 2022-04-04 PROCEDURE — 84443 ASSAY THYROID STIM HORMONE: CPT | Performed by: ADVANCED PRACTICE MIDWIFE

## 2022-04-04 PROCEDURE — 85025 COMPLETE CBC W/AUTO DIFF WBC: CPT | Performed by: ADVANCED PRACTICE MIDWIFE

## 2022-04-04 PROCEDURE — 99999 PR PBB SHADOW E&M-EST. PATIENT-LVL II: ICD-10-PCS | Mod: PBBFAC,,, | Performed by: ADVANCED PRACTICE MIDWIFE

## 2022-04-04 PROCEDURE — 99213 OFFICE O/P EST LOW 20 MIN: CPT | Mod: TH,S$PBB,, | Performed by: ADVANCED PRACTICE MIDWIFE

## 2022-04-04 PROCEDURE — 99212 OFFICE O/P EST SF 10 MIN: CPT | Mod: PBBFAC,TH | Performed by: ADVANCED PRACTICE MIDWIFE

## 2022-04-04 PROCEDURE — 81511 FTL CGEN ABNOR FOUR ANAL: CPT | Performed by: ADVANCED PRACTICE MIDWIFE

## 2022-04-04 PROCEDURE — 99999 PR PBB SHADOW E&M-EST. PATIENT-LVL II: CPT | Mod: PBBFAC,,, | Performed by: ADVANCED PRACTICE MIDWIFE

## 2022-04-04 NOTE — PROGRESS NOTES
Chief Complaint   Patient presents with    Routine Prenatal Visit       21 y.o.,  at 16w4d by  US    Complaints today: Adonis here.  Doing well without concerns. Is feeling flutters/fetal movement, randomly at this point.  No questions.    TWG: -8 lbs, was sick but better now. Keeping things down now. Gaining again.    ROS  OBSTETRICS:   Contractions No   Bleeding No   Loss of fluid No    GASTRO:   Nausea No   Vomiting No      OB History    Para Term  AB Living   4 2 2 0 1 2   SAB IAB Ectopic Multiple Live Births   0 1 0 0 2      # Outcome Date GA Lbr Bean/2nd Weight Sex Delivery Anes PTL Lv   4 Current            3 Term 21 39w1d / 00:05 4 kg (8 lb 13.1 oz) M Vag-Spont None N LUKASZ   2 IAB 2020           1 Term 18 37w0d  3.487 kg (7 lb 11 oz) F Vag-Spont EPI  LUKASZ      Obstetric Comments   Labors: 11 hours & 2 hours       Dating reviewed  Allergies and problem list reviewed and updated  Medical and surgical history reviewed  Prenatal labs reviewed and updated    PHYSICAL EXAM  /62   LMP 2021 (Approximate)     GENERAL: No acute distress  HEENT: Normocephalic, atruamatic  NEURO: Alert and oriented x3  PSYCH: Calm, normal mood and affect  PULMONARY: Non-labored respiration; no tachypnea  ABD: Soft, gravid, nontender  FH = more than half way between PS & umbilicus    ASSESSMENT AND PLAN     Problems (from 21 to present)     Problem Noted Resolved    Pregnancy 3/2/2022 by Jose Armando Maurer CNM No    Overview Signed 3/2/2022  4:45 PM by Jose Armando Maurer CNM     Prepregnancy BMI: 26  Pap: 2018 NILM, DUE   Dating - by 7 week sonogram  U/S -  Aneuploidy screening -  Blood type: O POS. Rhogam: not indicated  GDM screen -   Vaccines - [ ]Flu [ ]TDAP  GBS  [ ]Consents  Contraception -  Peds -   Circ -   COVID vax-                      During quad screen today.    Anatomy ultrasound with MFM ordered/discussed.    Reviewed warning signs and pregnancy precautions, along  with how/when to call.  Follow up: 4 wks, call or present sooner prn.

## 2022-04-05 LAB
HBV SURFACE AG SERPL QL IA: NEGATIVE
HCV AB SERPL QL IA: NEGATIVE
HIV 1+2 AB+HIV1 P24 AG SERPL QL IA: NEGATIVE
RPR SER QL: NORMAL
RUBV IGG SER-ACNC: 107 IU/ML
RUBV IGG SER-IMP: REACTIVE

## 2022-04-08 ENCOUNTER — LAB VISIT (OUTPATIENT)
Dept: LAB | Facility: HOSPITAL | Age: 22
End: 2022-04-08
Attending: MIDWIFE
Payer: MEDICAID

## 2022-04-08 DIAGNOSIS — Z87.798 HISTORY OF CONGENITAL ABNORMALITY: ICD-10-CM

## 2022-04-08 PROCEDURE — 36415 COLL VENOUS BLD VENIPUNCTURE: CPT | Performed by: ADVANCED PRACTICE MIDWIFE

## 2022-04-08 PROCEDURE — 81511 FTL CGEN ABNOR FOUR ANAL: CPT | Performed by: ADVANCED PRACTICE MIDWIFE

## 2022-04-11 LAB
# FETUSES US: NORMAL
2ND TRIMESTER 4 SCREEN PNL SERPL: NEGATIVE
2ND TRIMESTER 4 SCREEN SERPL-IMP: NORMAL
AFP MOM SERPL: 1.23
AFP SERPL-MCNC: 43.1 NG/ML
AGE AT DELIVERY: 23
B-HCG MOM SERPL: 0.81
B-HCG SERPL-ACNC: 26.1 IU/ML
FET TS 21 RISK FROM MAT AGE: NORMAL
GA (DAYS): 4 D
GA (WEEKS): 16 WK
GA METHOD: NORMAL
IDDM PATIENT QL: NORMAL
INHIBIN A MOM SERPL: 0.55
INHIBIN A SERPL-MCNC: 78.3 PG/ML
SMOKING STATUS FTND: NO
TS 18 RISK FETUS: NORMAL
TS 21 RISK FETUS: NORMAL
U ESTRIOL MOM SERPL: 1.21
U ESTRIOL SERPL-MCNC: 1.36 NG/ML

## 2022-04-13 LAB
# FETUSES US: NORMAL
2ND TRIMESTER 4 SCREEN PNL SERPL: NEGATIVE
2ND TRIMESTER 4 SCREEN SERPL-IMP: NORMAL
AFP MOM SERPL: 1.31
AFP SERPL-MCNC: 49.5 NG/ML
AGE AT DELIVERY: 23
B-HCG MOM SERPL: 1.03
B-HCG SERPL-ACNC: 29.3 IU/ML
FET TS 21 RISK FROM MAT AGE: NORMAL
GA (DAYS): 1 D
GA (WEEKS): 17 WK
GA METHOD: NORMAL
IDDM PATIENT QL: NORMAL
INHIBIN A MOM SERPL: 0.54
INHIBIN A SERPL-MCNC: 76.3 PG/ML
SMOKING STATUS FTND: NO
TS 18 RISK FETUS: NORMAL
TS 21 RISK FETUS: NORMAL
U ESTRIOL MOM SERPL: 1.36
U ESTRIOL SERPL-MCNC: 1.77 NG/ML

## 2022-04-14 ENCOUNTER — TELEPHONE (OUTPATIENT)
Dept: OBSTETRICS AND GYNECOLOGY | Facility: CLINIC | Age: 22
End: 2022-04-14
Payer: MEDICAID

## 2022-04-14 NOTE — TELEPHONE ENCOUNTER
Called and told that  Her quad screen was negative.      Wendy D Gerhardt, CNM/MAYNOR  4/14/2022 2:48 PM

## 2022-04-22 ENCOUNTER — PATIENT MESSAGE (OUTPATIENT)
Dept: MATERNAL FETAL MEDICINE | Facility: CLINIC | Age: 22
End: 2022-04-22
Payer: MEDICAID

## 2022-04-25 ENCOUNTER — PROCEDURE VISIT (OUTPATIENT)
Dept: MATERNAL FETAL MEDICINE | Facility: CLINIC | Age: 22
End: 2022-04-25
Payer: MEDICAID

## 2022-04-25 ENCOUNTER — OFFICE VISIT (OUTPATIENT)
Dept: MATERNAL FETAL MEDICINE | Facility: CLINIC | Age: 22
End: 2022-04-25
Payer: MEDICAID

## 2022-04-25 VITALS — DIASTOLIC BLOOD PRESSURE: 59 MMHG | SYSTOLIC BLOOD PRESSURE: 117 MMHG

## 2022-04-25 DIAGNOSIS — Z36.89 ENCOUNTER FOR ULTRASOUND TO ASSESS FETAL GROWTH: ICD-10-CM

## 2022-04-25 DIAGNOSIS — Z34.90 PREGNANCY WITH ONE FETUS, ANTEPARTUM: ICD-10-CM

## 2022-04-25 DIAGNOSIS — O35.EXX0 FETAL RENAL ANOMALY, SINGLE GESTATION: ICD-10-CM

## 2022-04-25 DIAGNOSIS — Z36.2 ENCOUNTER FOR FOLLOW-UP ULTRASOUND OF FETAL ANATOMY: Primary | ICD-10-CM

## 2022-04-25 PROCEDURE — 76811 OB US DETAILED SNGL FETUS: CPT | Mod: PBBFAC | Performed by: OBSTETRICS & GYNECOLOGY

## 2022-04-25 PROCEDURE — 76811 PR US, OB FETAL EVAL & EXAM, TRANSABDOM,FIRST GESTATION: ICD-10-PCS | Mod: 26,S$PBB,, | Performed by: OBSTETRICS & GYNECOLOGY

## 2022-04-25 PROCEDURE — 3074F SYST BP LT 130 MM HG: CPT | Mod: CPTII,,, | Performed by: OBSTETRICS & GYNECOLOGY

## 2022-04-25 PROCEDURE — 99213 OFFICE O/P EST LOW 20 MIN: CPT | Mod: 25,S$PBB,TH, | Performed by: OBSTETRICS & GYNECOLOGY

## 2022-04-25 PROCEDURE — 99213 PR OFFICE/OUTPT VISIT, EST, LEVL III, 20-29 MIN: ICD-10-PCS | Mod: 25,S$PBB,TH, | Performed by: OBSTETRICS & GYNECOLOGY

## 2022-04-25 PROCEDURE — 76811 OB US DETAILED SNGL FETUS: CPT | Mod: 26,S$PBB,, | Performed by: OBSTETRICS & GYNECOLOGY

## 2022-04-25 PROCEDURE — 3078F DIAST BP <80 MM HG: CPT | Mod: CPTII,,, | Performed by: OBSTETRICS & GYNECOLOGY

## 2022-04-25 PROCEDURE — 3074F PR MOST RECENT SYSTOLIC BLOOD PRESSURE < 130 MM HG: ICD-10-PCS | Mod: CPTII,,, | Performed by: OBSTETRICS & GYNECOLOGY

## 2022-04-25 PROCEDURE — 3078F PR MOST RECENT DIASTOLIC BLOOD PRESSURE < 80 MM HG: ICD-10-PCS | Mod: CPTII,,, | Performed by: OBSTETRICS & GYNECOLOGY

## 2022-04-25 NOTE — ASSESSMENT & PLAN NOTE
Fetal urinary tract dilation  We discussed today's ultrasound findings. I reviewed with the patient today that about 3% of normal fetuses have the finding of urinary tract dilation. We discussed the causes of UTD including an obstructive process, urinary tract reflux, a marker for other conditions such as aneuploidy, and also that this can be a normal variant that resolves. There were no other structural abnormalities noted on ultrasound, therefore aneuploidy is unlikely, however the anatomy survey does remain incomplete.    The patient's classification of urinary tract dilation is A1 (low risk).  Most cases of UTD resolve spontaneously and do not require evaluation or intervention postnatally. If UTD persists postnatally, the  may be at increased risk for kidney infections and kidney disease.   I recommend the patient undergo repeat ultrasound assessment at 32 weeks. If the UTD is persistent at that time, would recommend  assessment by the pediatric team with possible referral to pediatric urology.

## 2022-04-25 NOTE — PROGRESS NOTES
Maternal Fetal Medicine consult    SUBJECTIVE:     Jolly Collier is a 21 y.o.  female with IUP at 19w4d who is seen in consultation by MFM for evaluation and management of:  Problem   Fetal Renal Anomaly, Single Gestation       OB HX:  G1 term   G2 term   G3 sab  G4 current    Past Medical History:   Diagnosis Date    Herpes 2017    genital     Past Surgical History:   Procedure Laterality Date    lasix  2020    eyes    WISDOM TOOTH EXTRACTION      no complications     Family history: negative for birth defects, recurrent miscarriages, chromosomal abnormalities.   Social History     Tobacco Use    Smoking status: Never Smoker    Smokeless tobacco: Never Used   Substance Use Topics    Alcohol use: No    Drug use: No     Review of patient's allergies indicates:   Allergen Reactions    Amoxicillin     Penicillins      Medications:  PNV    Aneuploidy screening:   Quad screen low risk    Records reviewed    Care team members:  MAR[facility]A service - Primary OB     OBJECTIVE:     Blood Pressure: 111/76  Weight: 69kg    Ultrasound performed. See viewpoint for full ultrasound report.  A detailed fetal anatomic ultrasound examination was performed for the following high risk indication: left urinary tract dilation A1.   Renal pelvis dilation was identified on ultrasound today, measuring 4.5 mm on the left kidney. Otherwise, renal anatomy appears unremarkable. The parenchyma of the kidney appears normal. The ureter was not visualized. The bladder appears normal. The contralateral kidney appears normal as well.   This is consistent with UTD A1 (low risk).   No other fetal structural malformations are identified; however, fetal imaging is incomplete today.   Fetal size today is consistent with established gestational age.   Cervical length is normal.   Placental location is anterior without evidence of previa.     ASSESSMENT/PLAN:     21 y.o.  female with IUP at 19w4d    Fetal renal anomaly, single  gestation  Fetal urinary tract dilation  We discussed today's ultrasound findings. I reviewed with the patient today that about 3% of normal fetuses have the finding of urinary tract dilation. We discussed the causes of UTD including an obstructive process, urinary tract reflux, a marker for other conditions such as aneuploidy, and also that this can be a normal variant that resolves. There were no other structural abnormalities noted on ultrasound, therefore aneuploidy is unlikely, however the anatomy survey does remain incomplete.    The patient's classification of urinary tract dilation is A1 (low risk).  Most cases of UTD resolve spontaneously and do not require evaluation or intervention postnatally. If UTD persists postnatally, the  may be at increased risk for kidney infections and kidney disease.   I recommend the patient undergo repeat ultrasound assessment at 32 weeks. If the UTD is persistent at that time, would recommend  assessment by the pediatric team with possible referral to pediatric urology.        Today I have spent 20 minutes in the care of the patient. This includes face to face time and non-face to face time preparing to see the patient (eg, review of tests), obtaining and/or reviewing separately obtained history, documenting clinical information in the electronic or other health record, independently interpreting results and communicating results to the patient/family/caregiver, or care coordination.       Jacinta Marroquin MD  Maternal Fetal Medicine fellow  PGY-7

## 2022-05-09 ENCOUNTER — ROUTINE PRENATAL (OUTPATIENT)
Dept: OBSTETRICS AND GYNECOLOGY | Facility: CLINIC | Age: 22
End: 2022-05-09
Payer: MEDICAID

## 2022-05-09 VITALS
BODY MASS INDEX: 29.27 KG/M2 | WEIGHT: 154.88 LBS | DIASTOLIC BLOOD PRESSURE: 60 MMHG | SYSTOLIC BLOOD PRESSURE: 116 MMHG

## 2022-05-09 DIAGNOSIS — Z34.90 PREGNANCY WITH ONE FETUS, ANTEPARTUM: Primary | ICD-10-CM

## 2022-05-09 DIAGNOSIS — O35.EXX0 FETAL RENAL ANOMALY, SINGLE GESTATION: ICD-10-CM

## 2022-05-09 DIAGNOSIS — Z87.798 HISTORY OF CONGENITAL ABNORMALITY: ICD-10-CM

## 2022-05-09 DIAGNOSIS — Z34.90 PREGNANCY, UNSPECIFIED GESTATIONAL AGE: ICD-10-CM

## 2022-05-09 PROCEDURE — 99213 PR OFFICE/OUTPT VISIT, EST, LEVL III, 20-29 MIN: ICD-10-PCS | Mod: TH,S$PBB,, | Performed by: ADVANCED PRACTICE MIDWIFE

## 2022-05-09 PROCEDURE — 99999 PR PBB SHADOW E&M-EST. PATIENT-LVL II: ICD-10-PCS | Mod: PBBFAC,,, | Performed by: ADVANCED PRACTICE MIDWIFE

## 2022-05-09 PROCEDURE — 99212 OFFICE O/P EST SF 10 MIN: CPT | Mod: PBBFAC,TH | Performed by: ADVANCED PRACTICE MIDWIFE

## 2022-05-09 PROCEDURE — 99999 PR PBB SHADOW E&M-EST. PATIENT-LVL II: CPT | Mod: PBBFAC,,, | Performed by: ADVANCED PRACTICE MIDWIFE

## 2022-05-09 PROCEDURE — 99213 OFFICE O/P EST LOW 20 MIN: CPT | Mod: TH,S$PBB,, | Performed by: ADVANCED PRACTICE MIDWIFE

## 2022-05-09 RX ORDER — NAPROXEN SODIUM 220 MG/1
81 TABLET, FILM COATED ORAL DAILY
Qty: 30 TABLET | Refills: 6 | Status: CANCELLED | OUTPATIENT
Start: 2022-05-09 | End: 2023-05-09

## 2022-05-09 NOTE — PROGRESS NOTES
No chief complaint on file.      21 y.o., at 21w4d by Estimated Date of Delivery: 9/15/22    Complaints today: Here with daughter. Doing well.  No questions    TWG: -7 lbs, has not thrown up for a month, eating well now    ROS  OBSTETRICS:   Contractions No   Bleeding No   Loss of fluid No   Fetal mvmnt yes  GASTRO:   Nausea No   Vomiting No      OB History    Para Term  AB Living   4 2 2 0 1 2   SAB IAB Ectopic Multiple Live Births   0 1 0 0 2      # Outcome Date GA Lbr Bean/2nd Weight Sex Delivery Anes PTL Lv   4 Current            3 Term 21 39w1d / 00:05 4 kg (8 lb 13.1 oz) M Vag-Spont None N LUKASZ   2 IAB 2020           1 Term 18 37w0d  3.487 kg (7 lb 11 oz) F Vag-Spont EPI  LUKASZ      Obstetric Comments   Labors: 11 hours & 2 hours       Dating reviewed  Allergies and problem list reviewed and updated  Medical and surgical history reviewed  Prenatal labs reviewed and updated    PHYSICAL EXAM  LMP 2021 (Approximate)     GENERAL: No acute distress  SKIN: Clean, dry, intact.  HEENT: Normocephalic, atraumatic  NEURO: Alert and oriented x3  PSYCH: Normal mood and affect  PULMONARY: Non-labored respiration; no tachypnea  ABD: Soft, gravid, nontender.  FH = umbilicus    ASSESSMENT AND PLAN     Problems (from 21 to present)     Problem Noted Resolved    Pregnancy 3/2/2022 by Jose Armando Maurer CNM No    Overview Addendum 2022  2:49 PM by Wendy D. Gerhardt, CNM     Prepregnancy BMI: 26  Pap: 2018 NILM, DUE   Dating - by 7 week sonogram  U/S - UTD noted repeat @32 wks  Aneuploidy screening -quad screen NEG  Blood type: O POS. Rhogam: not indicated  GDM screen -   Vaccines - [ ]Flu [ ]TDAP  GBS  [ ]Consents  Contraception -  Peds -   Circ -   COVID vax-              Previous Version            Reviewed anatomy ultrasound  Reviewed exercise/diet recommendations in pregnancy.  Encouraged prenatal education classes - schedule given.  Education regarding  labor warning  s/s.  Confirmed after-hours number given to patient.    Reviewed warning signs, normal FM, and how/when to call.    Follow-up: 4 weeks, call or present sooner PRN

## 2022-05-24 ENCOUNTER — PATIENT MESSAGE (OUTPATIENT)
Dept: MATERNAL FETAL MEDICINE | Facility: CLINIC | Age: 22
End: 2022-05-24
Payer: MEDICAID

## 2022-05-25 ENCOUNTER — PROCEDURE VISIT (OUTPATIENT)
Dept: MATERNAL FETAL MEDICINE | Facility: CLINIC | Age: 22
End: 2022-05-25
Payer: MEDICAID

## 2022-05-25 DIAGNOSIS — Z36.2 ENCOUNTER FOR FOLLOW-UP ULTRASOUND OF FETAL ANATOMY: ICD-10-CM

## 2022-05-25 PROCEDURE — 76816 OB US FOLLOW-UP PER FETUS: CPT | Mod: PBBFAC | Performed by: OBSTETRICS & GYNECOLOGY

## 2022-05-25 PROCEDURE — 76816 US MFM PROCEDURE (VIEWPOINT): ICD-10-PCS | Mod: 26,S$PBB,, | Performed by: OBSTETRICS & GYNECOLOGY

## 2022-06-02 ENCOUNTER — PATIENT MESSAGE (OUTPATIENT)
Dept: ADMINISTRATIVE | Facility: OTHER | Age: 22
End: 2022-06-02
Payer: MEDICAID

## 2022-06-06 ENCOUNTER — ROUTINE PRENATAL (OUTPATIENT)
Dept: OBSTETRICS AND GYNECOLOGY | Facility: CLINIC | Age: 22
End: 2022-06-06
Payer: MEDICAID

## 2022-06-06 VITALS
SYSTOLIC BLOOD PRESSURE: 104 MMHG | DIASTOLIC BLOOD PRESSURE: 62 MMHG | BODY MASS INDEX: 30.42 KG/M2 | WEIGHT: 160.94 LBS

## 2022-06-06 DIAGNOSIS — Z87.798 HISTORY OF CONGENITAL ABNORMALITY: ICD-10-CM

## 2022-06-06 DIAGNOSIS — O35.EXX0 FETAL RENAL ANOMALY, SINGLE GESTATION: ICD-10-CM

## 2022-06-06 DIAGNOSIS — Z34.90 PREGNANCY WITH ONE FETUS, ANTEPARTUM: Primary | ICD-10-CM

## 2022-06-06 PROCEDURE — 99999 PR PBB SHADOW E&M-EST. PATIENT-LVL I: CPT | Mod: PBBFAC,,, | Performed by: ADVANCED PRACTICE MIDWIFE

## 2022-06-06 PROCEDURE — 99211 OFF/OP EST MAY X REQ PHY/QHP: CPT | Mod: PBBFAC,TH | Performed by: ADVANCED PRACTICE MIDWIFE

## 2022-06-06 PROCEDURE — 99999 PR PBB SHADOW E&M-EST. PATIENT-LVL I: ICD-10-PCS | Mod: PBBFAC,,, | Performed by: ADVANCED PRACTICE MIDWIFE

## 2022-06-06 PROCEDURE — 99213 OFFICE O/P EST LOW 20 MIN: CPT | Mod: TH,S$PBB,, | Performed by: ADVANCED PRACTICE MIDWIFE

## 2022-06-06 PROCEDURE — 99213 PR OFFICE/OUTPT VISIT, EST, LEVL III, 20-29 MIN: ICD-10-PCS | Mod: TH,S$PBB,, | Performed by: ADVANCED PRACTICE MIDWIFE

## 2022-06-06 NOTE — PROGRESS NOTES
No chief complaint on file.      21 y.o. female  at 25w4d, by Estimated Date of Delivery: 9/15/22    Complaints today: Doing well. Doing well today.   Reviewed TWG: -1 lbs, keeping things down    ROS  OBSTETRICS:   Contractions No   Bleeding No   Loss of fluid No   Fetal mvmnt yes  GASTRO:   Nausea No   Vomiting No      OB History    Para Term  AB Living   4 2 2 0 1 2   SAB IAB Ectopic Multiple Live Births   0 1 0 0 2      # Outcome Date GA Lbr Bean/2nd Weight Sex Delivery Anes PTL Lv   4 Current            3 Term 21 39w1d / 00:05 4 kg (8 lb 13.1 oz) M Vag-Spont None N LUKASZ   2 IAB 2020           1 Term 18 37w0d  3.487 kg (7 lb 11 oz) F Vag-Spont EPI  LUKASZ      Obstetric Comments   Labors: 11 hours & 2 hours       Dating reviewed  Allergies and problem list reviewed and updated  Medical and surgical history reviewed  Prenatal labs reviewed and updated    PHYSICAL EXAM  LMP 2021 (Approximate)     GENERAL: No acute distress  HEENT: Normocephalic, atraumatic  NEURO: Alert and oriented x3  PSYCH: Normal mood and affect  PULMONARY: Non-labored respiration; no tachypnea  ABD: Soft, gravid, nontender      ASSESSMENT AND PLAN     Problems (from 21 to present)     Problem Noted Resolved    Pregnancy 3/2/2022 by Jose Armando Maurer CNM No    Overview Addendum 2022  2:17 PM by Wendy D. Gerhardt, CNM     Prepregnancy BMI: 26  Pap: 2018 NILM, had on 2021 NILM per pt portal [facility] [place] [facility]  Dating - by 7 week sonogram  U/S - UTD noted repeat @32 wks  Aneuploidy screening -quad screen NEG  Blood type: O POS. Rhogam: not indicated  GDM screen -   Vaccines - [ ]Flu [ ]TDAP  GBS  [ ]Consents  Contraception -  Peds -   Circ -   COVID vax-              Previous Version            Reviewed upcoming 28wk labs, (O POS) and orders placed  Education regarding warning signs of PreEclampsia, reviewed normal FM,  labor precautions, and how/when to call.    Follow-up: 4 weeks, call or present  sooner PRN

## 2022-06-27 ENCOUNTER — ROUTINE PRENATAL (OUTPATIENT)
Dept: OBSTETRICS AND GYNECOLOGY | Facility: CLINIC | Age: 22
End: 2022-06-27
Payer: MEDICAID

## 2022-06-27 ENCOUNTER — LAB VISIT (OUTPATIENT)
Dept: LAB | Facility: OTHER | Age: 22
End: 2022-06-27
Attending: ADVANCED PRACTICE MIDWIFE
Payer: MEDICAID

## 2022-06-27 VITALS
SYSTOLIC BLOOD PRESSURE: 118 MMHG | WEIGHT: 164.38 LBS | BODY MASS INDEX: 31.07 KG/M2 | DIASTOLIC BLOOD PRESSURE: 76 MMHG

## 2022-06-27 DIAGNOSIS — Z34.90 PREGNANCY, UNSPECIFIED GESTATIONAL AGE: ICD-10-CM

## 2022-06-27 DIAGNOSIS — Z34.93 PRENATAL CARE IN THIRD TRIMESTER: ICD-10-CM

## 2022-06-27 DIAGNOSIS — Z34.93 PRENATAL CARE IN THIRD TRIMESTER: Primary | ICD-10-CM

## 2022-06-27 LAB
BASOPHILS # BLD AUTO: 0.04 K/UL (ref 0–0.2)
BASOPHILS NFR BLD: 0.3 % (ref 0–1.9)
DIFFERENTIAL METHOD: ABNORMAL
EOSINOPHIL # BLD AUTO: 0.2 K/UL (ref 0–0.5)
EOSINOPHIL NFR BLD: 1.1 % (ref 0–8)
ERYTHROCYTE [DISTWIDTH] IN BLOOD BY AUTOMATED COUNT: 13.1 % (ref 11.5–14.5)
GLUCOSE SERPL-MCNC: 117 MG/DL (ref 70–140)
HCT VFR BLD AUTO: 37.1 % (ref 37–48.5)
HGB BLD-MCNC: 12.3 G/DL (ref 12–16)
IMM GRANULOCYTES # BLD AUTO: 0.08 K/UL (ref 0–0.04)
IMM GRANULOCYTES NFR BLD AUTO: 0.5 % (ref 0–0.5)
LYMPHOCYTES # BLD AUTO: 2.1 K/UL (ref 1–4.8)
LYMPHOCYTES NFR BLD: 13.5 % (ref 18–48)
MCH RBC QN AUTO: 30.3 PG (ref 27–31)
MCHC RBC AUTO-ENTMCNC: 33.2 G/DL (ref 32–36)
MCV RBC AUTO: 91 FL (ref 82–98)
MONOCYTES # BLD AUTO: 0.6 K/UL (ref 0.3–1)
MONOCYTES NFR BLD: 3.6 % (ref 4–15)
NEUTROPHILS # BLD AUTO: 12.9 K/UL (ref 1.8–7.7)
NEUTROPHILS NFR BLD: 81 % (ref 38–73)
NRBC BLD-RTO: 0 /100 WBC
PLATELET # BLD AUTO: 186 K/UL (ref 150–450)
PMV BLD AUTO: 12.7 FL (ref 9.2–12.9)
RBC # BLD AUTO: 4.06 M/UL (ref 4–5.4)
WBC # BLD AUTO: 15.89 K/UL (ref 3.9–12.7)

## 2022-06-27 PROCEDURE — 82950 GLUCOSE TEST: CPT | Performed by: ADVANCED PRACTICE MIDWIFE

## 2022-06-27 PROCEDURE — 86592 SYPHILIS TEST NON-TREP QUAL: CPT | Performed by: ADVANCED PRACTICE MIDWIFE

## 2022-06-27 PROCEDURE — 99213 PR OFFICE/OUTPT VISIT, EST, LEVL III, 20-29 MIN: ICD-10-PCS | Mod: S$PBB,TH,, | Performed by: ADVANCED PRACTICE MIDWIFE

## 2022-06-27 PROCEDURE — 87389 HIV-1 AG W/HIV-1&-2 AB AG IA: CPT | Performed by: ADVANCED PRACTICE MIDWIFE

## 2022-06-27 PROCEDURE — 85025 COMPLETE CBC W/AUTO DIFF WBC: CPT | Performed by: ADVANCED PRACTICE MIDWIFE

## 2022-06-27 PROCEDURE — 99213 OFFICE O/P EST LOW 20 MIN: CPT | Mod: S$PBB,TH,, | Performed by: ADVANCED PRACTICE MIDWIFE

## 2022-06-27 PROCEDURE — 36415 COLL VENOUS BLD VENIPUNCTURE: CPT | Performed by: ADVANCED PRACTICE MIDWIFE

## 2022-06-27 PROCEDURE — 99999 PR PBB SHADOW E&M-EST. PATIENT-LVL I: ICD-10-PCS | Mod: PBBFAC,,, | Performed by: ADVANCED PRACTICE MIDWIFE

## 2022-06-27 PROCEDURE — 99211 OFF/OP EST MAY X REQ PHY/QHP: CPT | Mod: PBBFAC,TH | Performed by: ADVANCED PRACTICE MIDWIFE

## 2022-06-27 PROCEDURE — 99999 PR PBB SHADOW E&M-EST. PATIENT-LVL I: CPT | Mod: PBBFAC,,, | Performed by: ADVANCED PRACTICE MIDWIFE

## 2022-06-27 NOTE — PROGRESS NOTES
No chief complaint on file.      21 y.o. female  at 28w4d, by Estimated Date of Delivery: 9/15/22    Complaints today: denies. Doing well today.   Reviewed TWG: see flow lbs    ROS  OBSTETRICS:   Contractions No   Bleeding No   Loss of fluid No   Fetal mvmnt Present  GASTRO:   Nausea No   Vomiting No      OB History    Para Term  AB Living   4 2 2 0 1 2   SAB IAB Ectopic Multiple Live Births   0 1 0 0 2      # Outcome Date GA Lbr Bean/2nd Weight Sex Delivery Anes PTL Lv   4 Current            3 Term 21 39w1d / 00:05 4 kg (8 lb 13.1 oz) M Vag-Spont None N LUKASZ   2 IAB 2020           1 Term 18 37w0d  3.487 kg (7 lb 11 oz) F Vag-Spont EPI  LUKASZ      Obstetric Comments   Labors: 11 hours & 2 hours       Dating reviewed  Allergies and problem list reviewed and updated  Medical and surgical history reviewed  Prenatal labs reviewed and updated    PHYSICAL EXAM  /76   Wt 74.6 kg (164 lb 5.7 oz)   LMP 2021 (Approximate)   BMI 31.07 kg/m²     GENERAL: No acute distress  HEENT: Normocephalic, atraumatic  NEURO: Alert and oriented x3  PSYCH: Normal mood and affect  PULMONARY: Non-labored respiration; no tachypnea  ABD: Soft, gravid, nontender      ASSESSMENT AND PLAN     Problems (from 21 to present)     Problem Noted Resolved    Pregnancy 3/2/2022 by Jose Armando Maurer CNM No    Overview Addendum 2022  2:17 PM by Wendy D. Gerhardt, CNM     Prepregnancy BMI: 26  Pap: 2018 NILM, had on 2021 NILM per pt portal [facility] [place]  Dating - by 7 week sonogram  U/S - UTD noted repeat @32 wks  Aneuploidy screening -quad screen NEG  Blood type: O POS. Rhogam: not indicated  GDM screen -   Vaccines - [ ]Flu [ ]TDAP  GBS  [ ]Consents  Contraception -  Peds -   Circ -   COVID vax-              Previous Version            Reviewed upcoming 28wk labs, (O POS) and orders placed--advised to complete this week.  Education regarding warning signs of PreEclampsia, reviewed normal FM,   labor precautions, and how/when to call.  Interested in Paragard--counseled today.  Follow-up: 4 weeks, call or present sooner DOMINGUEZ Alicia CNM, MSN  06/27/2022  2:32 PM

## 2022-06-28 LAB
HIV 1+2 AB+HIV1 P24 AG SERPL QL IA: NEGATIVE
RPR SER QL: NORMAL

## 2022-06-28 NOTE — PROGRESS NOTES
Normal glucose testing, CBC normal, message sent to patient via my chart.  Hue Alicia CNM, MSN  06/27/2022  11:04 PM

## 2022-07-05 ENCOUNTER — TELEPHONE (OUTPATIENT)
Dept: OBSTETRICS AND GYNECOLOGY | Facility: HOSPITAL | Age: 22
End: 2022-07-05
Payer: MEDICAID

## 2022-07-05 RX ORDER — VALACYCLOVIR HYDROCHLORIDE 500 MG/1
500 TABLET, FILM COATED ORAL 2 TIMES DAILY
Qty: 60 TABLET | Refills: 11 | Status: ON HOLD | OUTPATIENT
Start: 2022-07-05 | End: 2022-08-01 | Stop reason: HOSPADM

## 2022-07-05 NOTE — TELEPHONE ENCOUNTER
----- Message from Luly Nation MA sent at 7/5/2022 11:07 AM CDT -----  Regarding: FW: Pt Advice  Contact: KAVITHA GOMEZ [[id number]]  Joan Swann,    Please see below and advise.    Thank you  ----- Message -----  From: Butch Mijares  Sent: 7/5/2022  11:07 AM CDT  To: [facility] Staff  Subject: Pt Advice                                        Name of Who is Calling: KAVITHA GOMEZ [[id number]]      What is the request in detail: Patient 29 weeks, would like to speak with staff in regards to possible outbreak. Requesting a Valtrex prescription to go to [id number] - [place], [place] - [id number].      Can the clinic reply by [facility]: no      What Number to Call Back if not in [facility]: [phone]

## 2022-07-11 ENCOUNTER — ROUTINE PRENATAL (OUTPATIENT)
Dept: OBSTETRICS AND GYNECOLOGY | Facility: CLINIC | Age: 22
End: 2022-07-11
Payer: MEDICAID

## 2022-07-11 VITALS
SYSTOLIC BLOOD PRESSURE: 112 MMHG | WEIGHT: 170.06 LBS | BODY MASS INDEX: 32.15 KG/M2 | DIASTOLIC BLOOD PRESSURE: 72 MMHG

## 2022-07-11 DIAGNOSIS — Z3A.30 30 WEEKS GESTATION OF PREGNANCY: Primary | ICD-10-CM

## 2022-07-11 PROCEDURE — 99999 PR PBB SHADOW E&M-EST. PATIENT-LVL II: ICD-10-PCS | Mod: PBBFAC,,, | Performed by: ADVANCED PRACTICE MIDWIFE

## 2022-07-11 PROCEDURE — 99999 PR PBB SHADOW E&M-EST. PATIENT-LVL II: CPT | Mod: PBBFAC,,, | Performed by: ADVANCED PRACTICE MIDWIFE

## 2022-07-11 PROCEDURE — 99213 OFFICE O/P EST LOW 20 MIN: CPT | Mod: S$PBB,TH,, | Performed by: ADVANCED PRACTICE MIDWIFE

## 2022-07-11 PROCEDURE — 99212 OFFICE O/P EST SF 10 MIN: CPT | Mod: PBBFAC | Performed by: ADVANCED PRACTICE MIDWIFE

## 2022-07-11 PROCEDURE — 99213 PR OFFICE/OUTPT VISIT, EST, LEVL III, 20-29 MIN: ICD-10-PCS | Mod: S$PBB,TH,, | Performed by: ADVANCED PRACTICE MIDWIFE

## 2022-07-11 NOTE — PROGRESS NOTES
Chief Complaint   Patient presents with    Routine Prenatal Visit       21 y.o. female  at 30w4d, by Estimated Date of Delivery: 9/15/22    Complaints today: none. Doing well today.    Reviewed TW lbs    ROS  OBSTETRICS:   Contractions No   Bleeding No   Loss of fluid No   Fetal mvmnt Yes  GASTRO:   Nausea No   Vomiting No      OB History    Para Term  AB Living   4 2 2 0 1 2   SAB IAB Ectopic Multiple Live Births   0 1 0 0 2      # Outcome Date GA Lbr Bean/2nd Weight Sex Delivery Anes PTL Lv   4 Current            3 Term 21 39w1d / 00:05 4 kg (8 lb 13.1 oz) M Vag-Spont None N LUKASZ   2 IAB 2020           1 Term 18 37w0d  3.487 kg (7 lb 11 oz) F Vag-Spont EPI  LUKASZ      Obstetric Comments   Labors: 11 hours & 2 hours       Dating reviewed  Allergies and problem list reviewed and updated  Medical and surgical history reviewed  Prenatal labs reviewed and updated    PHYSICAL EXAM  /72   Wt 77.2 kg (170 lb 1.4 oz)   LMP 2021 (Approximate)   BMI 32.15 kg/m²     GENERAL: No acute distress  HEENT: Normocephalic, atraumatic  NEURO: Alert and oriented x3  PSYCH: Normal mood and affect  PULMONARY: Non-labored respiration; no tachypnea  ABD: Soft, gravid, nontender.      ASSESSMENT AND PLAN     Problems (from 21 to present)     Problem Noted Resolved    Pregnancy 3/2/2022 by Jose Armando Maurer CNM No    Overview Addendum 2022  2:17 PM by Wendy D. Gerhardt, CNM     Prepregnancy BMI: 26  Pap: 2018 NILM, had on 2021 NILM per pt portal [facility] [place]  Dating - by 7 week sonogram  U/S - UTD noted repeat @32 wks  Aneuploidy screening -quad screen NEG  Blood type: O POS. Rhogam: not indicated  GDM screen -   Vaccines - [ ]Flu [ ]TDAP  GBS  [ ]Consents  Contraception -  Peds -   Circ -   COVID vax-              Previous Version          Reviewed 28wk labs  Desires TDAP - will make appointment  Is not taking Childbirth Education classes.  Education regarding options for  postpartum contraception, patient desires paragard    Reviewed warning signs, normal FM,  labor precautions, and how/when to call.    Birth Center Risk Assessment: 0- Meets birth center guidelines    0- CNM management in ABC  1- CNM management on L&D  2- Consultation with OB to develop  plan of care  3- Collaborative CNM/OB management with delivery on L&D  4- Permanent referral of care to MD      Follow-up: 2 weeks, call or present sooner DOMINGUEZ Alcala CNM

## 2022-07-20 ENCOUNTER — PATIENT MESSAGE (OUTPATIENT)
Dept: MATERNAL FETAL MEDICINE | Facility: CLINIC | Age: 22
End: 2022-07-20
Payer: MEDICAID

## 2022-07-21 ENCOUNTER — PATIENT MESSAGE (OUTPATIENT)
Dept: ADMINISTRATIVE | Facility: OTHER | Age: 22
End: 2022-07-21
Payer: MEDICAID

## 2022-07-22 ENCOUNTER — PATIENT MESSAGE (OUTPATIENT)
Dept: MATERNAL FETAL MEDICINE | Facility: CLINIC | Age: 22
End: 2022-07-22
Payer: MEDICAID

## 2022-07-25 ENCOUNTER — OFFICE VISIT (OUTPATIENT)
Dept: MATERNAL FETAL MEDICINE | Facility: CLINIC | Age: 22
End: 2022-07-25
Attending: OBSTETRICS & GYNECOLOGY
Payer: MEDICAID

## 2022-07-25 ENCOUNTER — PROCEDURE VISIT (OUTPATIENT)
Dept: MATERNAL FETAL MEDICINE | Facility: CLINIC | Age: 22
End: 2022-07-25
Payer: MEDICAID

## 2022-07-25 DIAGNOSIS — O35.EXX0 FETAL RENAL ANOMALY, SINGLE GESTATION: Primary | ICD-10-CM

## 2022-07-25 DIAGNOSIS — Z36.89 ENCOUNTER FOR ULTRASOUND TO CHECK FETAL GROWTH: Primary | ICD-10-CM

## 2022-07-25 DIAGNOSIS — Z36.89 ENCOUNTER FOR ULTRASOUND TO ASSESS FETAL GROWTH: ICD-10-CM

## 2022-07-25 PROCEDURE — 1159F PR MEDICATION LIST DOCUMENTED IN MEDICAL RECORD: ICD-10-PCS | Mod: CPTII,,, | Performed by: OBSTETRICS & GYNECOLOGY

## 2022-07-25 PROCEDURE — 76816 PR  US,PREGNANT UTERUS,F/U,TRANSABD APP: ICD-10-PCS | Mod: 26,S$PBB,, | Performed by: OBSTETRICS & GYNECOLOGY

## 2022-07-25 PROCEDURE — 99213 PR OFFICE/OUTPT VISIT, EST, LEVL III, 20-29 MIN: ICD-10-PCS | Mod: S$PBB,TH,25, | Performed by: OBSTETRICS & GYNECOLOGY

## 2022-07-25 PROCEDURE — 99999 PR PBB SHADOW E&M-EST. PATIENT-LVL I: ICD-10-PCS | Mod: PBBFAC,,, | Performed by: OBSTETRICS & GYNECOLOGY

## 2022-07-25 PROCEDURE — 1159F MED LIST DOCD IN RCRD: CPT | Mod: CPTII,,, | Performed by: OBSTETRICS & GYNECOLOGY

## 2022-07-25 PROCEDURE — 1160F RVW MEDS BY RX/DR IN RCRD: CPT | Mod: CPTII,,, | Performed by: OBSTETRICS & GYNECOLOGY

## 2022-07-25 PROCEDURE — 1160F PR REVIEW ALL MEDS BY PRESCRIBER/CLIN PHARMACIST DOCUMENTED: ICD-10-PCS | Mod: CPTII,,, | Performed by: OBSTETRICS & GYNECOLOGY

## 2022-07-25 PROCEDURE — 99999 PR PBB SHADOW E&M-EST. PATIENT-LVL I: CPT | Mod: PBBFAC,,, | Performed by: OBSTETRICS & GYNECOLOGY

## 2022-07-25 PROCEDURE — 99213 OFFICE O/P EST LOW 20 MIN: CPT | Mod: S$PBB,TH,25, | Performed by: OBSTETRICS & GYNECOLOGY

## 2022-07-25 PROCEDURE — 76816 OB US FOLLOW-UP PER FETUS: CPT | Mod: PBBFAC | Performed by: OBSTETRICS & GYNECOLOGY

## 2022-07-25 PROCEDURE — 99211 OFF/OP EST MAY X REQ PHY/QHP: CPT | Mod: PBBFAC,TH | Performed by: OBSTETRICS & GYNECOLOGY

## 2022-07-25 PROCEDURE — 76816 OB US FOLLOW-UP PER FETUS: CPT | Mod: 26,S$PBB,, | Performed by: OBSTETRICS & GYNECOLOGY

## 2022-07-25 NOTE — PROGRESS NOTES
Maternal Fetal Medicine follow up consult    SUBJECTIVE:     Jolly Collier is a 21 y.o.  female with IUP at 32w4d who is seen in follow up consultation by [facility].  Pregnancy complications include:   No problems updated.    Previous notes reviewed.   No changes to medical, surgical, family, social, or obstetric history.    Interval history since last [facility] visit: Noncontributory    Medications:  Current Outpatient Medications   Medication Instructions    dextroamphetamine-amphetamine (ADDERALL) 20 mg tablet 0.5 tablets, Oral, 2 times daily PRN    prenatal vit/iron fum/folic ac (PRENATAL 1+1 ORAL) Oral    valACYclovir (VALTREX) 500 mg, Oral, 2 times daily       Care team members:  Gerhardt - Primary CNM       OBJECTIVE:   LMP 2021 (Approximate)       Ultrasound performed. See viewpoint for full ultrasound report.  Persistent Pyelectasis    Significant labs/imaging:  As above    ASSESSMENT/PLAN:     21 y.o.  female with IUP at 32w4d    I discussed today's findings with the patient.  We reviewed the prior information given to her on her initial consult.  The likely prognosis and  follow-up were discussed.  We will see her back at 36 weeks for a follow-up ultrasound and referral to Pediatric Urology should be made in the  period.  The patient was given an opportunity to ask questions about management and the diease process.  She expressed an understanding of and agreement to the above impression and plan. All questions were answered to her satisfaction.  She was given contact information to the [facility] clinic to address further concerns.      I spent a total of 20 minutes on the day of the visit. This includes face to face time and non-face to face time preparing to see the patient (eg, review of tests), Obtaining and/or reviewing separately obtained history, Documenting clinical information in the electronic or other health record, Independently interpreting results and  communicating results to the patient/family/caregiver, or Care coordination.

## 2022-07-25 NOTE — PROGRESS NOTES
Maternal Fetal Medicine follow up consult    SUBJECTIVE:     Jolly Collier is a 21 y.o.  female with IUP at 32w4d who is seen in follow up consultation by [facility].  Pregnancy complications include:   No problems updated.    Previous notes reviewed.   No changes to medical, surgical, family, social, or obstetric history.    Interval history since last [facility] visit: Noncontributory    Medications:  Current Outpatient Medications   Medication Instructions    dextroamphetamine-amphetamine (ADDERALL) 20 mg tablet 0.5 tablets, Oral, 2 times daily PRN    prenatal vit/iron fum/folic ac (PRENATAL 1+1 ORAL) Oral    valACYclovir (VALTREX) 500 mg, Oral, 2 times daily       Care team members:  Gerhardt - Primary CNM       OBJECTIVE:   LMP 2021 (Approximate)       Ultrasound performed. See viewpoint for full ultrasound report.  Persistent Pyelectasis    Significant labs/imaging:  As above    ASSESSMENT/PLAN:     21 y.o.  female with IUP at 32w4d    I discussed today's findings with the patient.  We reviewed the prior information given to her on her initial consult.  The likely prognosis and  follow-up were discussed.  We will see her back at 36 weeks for a follow-up ultrasound and referral to Pediatric Urology should be made in the  period.  The patient was given an opportunity to ask questions about management and the diease process.  She expressed an understanding of and agreement to the above impression and plan. All questions were answered to her satisfaction.  She was given contact information to the [facility] clinic to address further concerns.      I spent a total of 20 minutes on the day of the visit. This includes face to face time and non-face to face time preparing to see the patient (eg, review of tests), Obtaining and/or reviewing separately obtained history, Documenting clinical information in the electronic or other health record, Independently interpreting results and  communicating results to the patient/family/caregiver, or Care coordination.

## 2022-07-27 ENCOUNTER — TELEPHONE (OUTPATIENT)
Dept: OBSTETRICS AND GYNECOLOGY | Facility: HOSPITAL | Age: 22
End: 2022-07-27
Payer: MEDICAID

## 2022-07-27 NOTE — TELEPHONE ENCOUNTER
"Called Jolly after she reached out to [facility] on call.    She reports that she had one episode this morning after she woke up of noticing slight spotting when she wiped after voiding. She had some mild cramping last night that a bath relieved, but nothing repetitive and nothing now. She noted one tiny "clot" smaller than a pea.  No LOF  Baby active per usual.  Advised nothing in vagina until at least 48 hours after spotting ends.  Warning signs reviewed and instructed to call if symptoms increase or with any further questions.      Wendy D Gerhardt, MARÍA/MAYNOR  7/27/2022 10:12 AM    "

## 2022-07-30 ENCOUNTER — ANESTHESIA (OUTPATIENT)
Dept: OBSTETRICS AND GYNECOLOGY | Facility: OTHER | Age: 22
End: 2022-07-30

## 2022-07-30 ENCOUNTER — HOSPITAL ENCOUNTER (INPATIENT)
Facility: OTHER | Age: 22
LOS: 2 days | Discharge: HOME | End: 2022-08-01
Attending: OBSTETRICS & GYNECOLOGY | Admitting: OBSTETRICS & GYNECOLOGY
Payer: MEDICAID

## 2022-07-30 ENCOUNTER — ANESTHESIA EVENT (OUTPATIENT)
Dept: OBSTETRICS AND GYNECOLOGY | Facility: OTHER | Age: 22
End: 2022-07-30

## 2022-07-30 DIAGNOSIS — O60.00 PRETERM LABOR: ICD-10-CM

## 2022-07-30 DIAGNOSIS — Z3A.33 33 WEEKS GESTATION OF PREGNANCY: ICD-10-CM

## 2022-07-30 LAB
ABO + RH BLD: NORMAL
BASOPHILS # BLD AUTO: 0.05 K/UL (ref 0–0.2)
BASOPHILS NFR BLD: 0.3 % (ref 0–1.9)
BLD GP AB SCN CELLS X3 SERPL QL: NORMAL
DIFFERENTIAL METHOD: ABNORMAL
EOSINOPHIL # BLD AUTO: 0.1 K/UL (ref 0–0.5)
EOSINOPHIL NFR BLD: 0.7 % (ref 0–8)
ERYTHROCYTE [DISTWIDTH] IN BLOOD BY AUTOMATED COUNT: 13.3 % (ref 11.5–14.5)
HCT VFR BLD AUTO: 36.5 % (ref 37–48.5)
HGB BLD-MCNC: 12.2 G/DL (ref 12–16)
HIV 1+2 AB+HIV1 P24 AG SERPL QL IA: NEGATIVE
IMM GRANULOCYTES # BLD AUTO: 0.09 K/UL (ref 0–0.04)
IMM GRANULOCYTES NFR BLD AUTO: 0.5 % (ref 0–0.5)
LYMPHOCYTES # BLD AUTO: 2.1 K/UL (ref 1–4.8)
LYMPHOCYTES NFR BLD: 12.4 % (ref 18–48)
MCH RBC QN AUTO: 30 PG (ref 27–31)
MCHC RBC AUTO-ENTMCNC: 33.4 G/DL (ref 32–36)
MCV RBC AUTO: 90 FL (ref 82–98)
MONOCYTES # BLD AUTO: 0.7 K/UL (ref 0.3–1)
MONOCYTES NFR BLD: 4.4 % (ref 4–15)
NEUTROPHILS # BLD AUTO: 13.8 K/UL (ref 1.8–7.7)
NEUTROPHILS NFR BLD: 81.7 % (ref 38–73)
NRBC BLD-RTO: 0 /100 WBC
PLATELET # BLD AUTO: 214 K/UL (ref 150–450)
PMV BLD AUTO: 12.8 FL (ref 9.2–12.9)
RBC # BLD AUTO: 4.06 M/UL (ref 4–5.4)
SARS-COV-2 RDRP RESP QL NAA+PROBE: NEGATIVE
WBC # BLD AUTO: 16.88 K/UL (ref 3.9–12.7)

## 2022-07-30 PROCEDURE — 11000001 HC ACUTE MED/SURG PRIVATE ROOM

## 2022-07-30 PROCEDURE — 99284 EMERGENCY DEPT VISIT MOD MDM: CPT | Mod: 25,,, | Performed by: STUDENT IN AN ORGANIZED HEALTH CARE EDUCATION/TRAINING PROGRAM

## 2022-07-30 PROCEDURE — 59409 PR OBSTETRICAL CARE,VAG DELIV ONLY: ICD-10-PCS | Mod: AT,,, | Performed by: OBSTETRICS & GYNECOLOGY

## 2022-07-30 PROCEDURE — 87147 CULTURE TYPE IMMUNOLOGIC: CPT | Performed by: STUDENT IN AN ORGANIZED HEALTH CARE EDUCATION/TRAINING PROGRAM

## 2022-07-30 PROCEDURE — 63600175 PHARM REV CODE 636 W HCPCS: Performed by: STUDENT IN AN ORGANIZED HEALTH CARE EDUCATION/TRAINING PROGRAM

## 2022-07-30 PROCEDURE — 87081 CULTURE SCREEN ONLY: CPT | Performed by: STUDENT IN AN ORGANIZED HEALTH CARE EDUCATION/TRAINING PROGRAM

## 2022-07-30 PROCEDURE — 87184 SC STD DISK METHOD PER PLATE: CPT | Performed by: STUDENT IN AN ORGANIZED HEALTH CARE EDUCATION/TRAINING PROGRAM

## 2022-07-30 PROCEDURE — 36415 COLL VENOUS BLD VENIPUNCTURE: CPT | Performed by: STUDENT IN AN ORGANIZED HEALTH CARE EDUCATION/TRAINING PROGRAM

## 2022-07-30 PROCEDURE — U0002 COVID-19 LAB TEST NON-CDC: HCPCS | Performed by: STUDENT IN AN ORGANIZED HEALTH CARE EDUCATION/TRAINING PROGRAM

## 2022-07-30 PROCEDURE — 99285 EMERGENCY DEPT VISIT HI MDM: CPT | Mod: 25

## 2022-07-30 PROCEDURE — 85025 COMPLETE CBC W/AUTO DIFF WBC: CPT | Performed by: STUDENT IN AN ORGANIZED HEALTH CARE EDUCATION/TRAINING PROGRAM

## 2022-07-30 PROCEDURE — 59409 OBSTETRICAL CARE: CPT | Mod: AT,,, | Performed by: OBSTETRICS & GYNECOLOGY

## 2022-07-30 PROCEDURE — 86592 SYPHILIS TEST NON-TREP QUAL: CPT | Performed by: STUDENT IN AN ORGANIZED HEALTH CARE EDUCATION/TRAINING PROGRAM

## 2022-07-30 PROCEDURE — 63600175 PHARM REV CODE 636 W HCPCS: Performed by: ADVANCED PRACTICE MIDWIFE

## 2022-07-30 PROCEDURE — 99284 PR EMERGENCY DEPT VISIT,LEVEL IV: ICD-10-PCS | Mod: 25,,, | Performed by: STUDENT IN AN ORGANIZED HEALTH CARE EDUCATION/TRAINING PROGRAM

## 2022-07-30 PROCEDURE — 25000003 PHARM REV CODE 250: Performed by: STUDENT IN AN ORGANIZED HEALTH CARE EDUCATION/TRAINING PROGRAM

## 2022-07-30 PROCEDURE — 87389 HIV-1 AG W/HIV-1&-2 AB AG IA: CPT | Performed by: STUDENT IN AN ORGANIZED HEALTH CARE EDUCATION/TRAINING PROGRAM

## 2022-07-30 PROCEDURE — 59025 PR FETAL 2N-STRESS TEST: ICD-10-PCS | Mod: 26,,, | Performed by: STUDENT IN AN ORGANIZED HEALTH CARE EDUCATION/TRAINING PROGRAM

## 2022-07-30 PROCEDURE — 59025 FETAL NON-STRESS TEST: CPT

## 2022-07-30 PROCEDURE — 59025 FETAL NON-STRESS TEST: CPT | Mod: 26,,, | Performed by: STUDENT IN AN ORGANIZED HEALTH CARE EDUCATION/TRAINING PROGRAM

## 2022-07-30 PROCEDURE — 86850 RBC ANTIBODY SCREEN: CPT | Performed by: STUDENT IN AN ORGANIZED HEALTH CARE EDUCATION/TRAINING PROGRAM

## 2022-07-30 RX ORDER — ACETAMINOPHEN 325 MG/1
650 TABLET ORAL EVERY 6 HOURS PRN
Status: DISCONTINUED | OUTPATIENT
Start: 2022-07-30 | End: 2022-07-31

## 2022-07-30 RX ORDER — SIMETHICONE 80 MG
1 TABLET,CHEWABLE ORAL EVERY 6 HOURS PRN
Status: DISCONTINUED | OUTPATIENT
Start: 2022-07-30 | End: 2022-07-31

## 2022-07-30 RX ORDER — OXYTOCIN/RINGER'S LACTATE 30/500 ML
PLASTIC BAG, INJECTION (ML) INTRAVENOUS
Status: COMPLETED
Start: 2022-07-30 | End: 2022-07-31

## 2022-07-30 RX ORDER — LIDOCAINE HYDROCHLORIDE 10 MG/ML
INJECTION INFILTRATION; PERINEURAL
Status: COMPLETED
Start: 2022-07-30 | End: 2022-07-31

## 2022-07-30 RX ORDER — MISOPROSTOL 200 UG/1
TABLET ORAL
Status: DISPENSED
Start: 2022-07-30 | End: 2022-07-31

## 2022-07-30 RX ORDER — METHYLERGONOVINE MALEATE 0.2 MG/ML
INJECTION INTRAVENOUS
Status: DISPENSED
Start: 2022-07-30 | End: 2022-07-31

## 2022-07-30 RX ORDER — ONDANSETRON 8 MG/1
8 TABLET, ORALLY DISINTEGRATING ORAL EVERY 8 HOURS PRN
Status: DISCONTINUED | OUTPATIENT
Start: 2022-07-30 | End: 2022-07-31

## 2022-07-30 RX ORDER — AMOXICILLIN 250 MG
1 CAPSULE ORAL NIGHTLY PRN
Status: DISCONTINUED | OUTPATIENT
Start: 2022-07-30 | End: 2022-07-31

## 2022-07-30 RX ORDER — NIFEDIPINE 20 MG/1
20 CAPSULE ORAL ONCE
Status: COMPLETED | OUTPATIENT
Start: 2022-07-30 | End: 2022-07-30

## 2022-07-30 RX ORDER — BETAMETHASONE SODIUM PHOSPHATE AND BETAMETHASONE ACETATE 3; 3 MG/ML; MG/ML
12 INJECTION, SUSPENSION INTRA-ARTICULAR; INTRALESIONAL; INTRAMUSCULAR; SOFT TISSUE EVERY 24 HOURS
Status: DISCONTINUED | OUTPATIENT
Start: 2022-07-30 | End: 2022-07-31

## 2022-07-30 RX ORDER — DIPHENHYDRAMINE HYDROCHLORIDE 50 MG/ML
25 INJECTION INTRAMUSCULAR; INTRAVENOUS EVERY 4 HOURS PRN
Status: DISCONTINUED | OUTPATIENT
Start: 2022-07-30 | End: 2022-07-31

## 2022-07-30 RX ORDER — SODIUM CHLORIDE, SODIUM LACTATE, POTASSIUM CHLORIDE, CALCIUM CHLORIDE 600; 310; 30; 20 MG/100ML; MG/100ML; MG/100ML; MG/100ML
INJECTION, SOLUTION INTRAVENOUS CONTINUOUS
Status: DISCONTINUED | OUTPATIENT
Start: 2022-07-30 | End: 2022-07-31

## 2022-07-30 RX ORDER — NIFEDIPINE 10 MG/1
10 CAPSULE ORAL
Status: DISCONTINUED | OUTPATIENT
Start: 2022-07-30 | End: 2022-07-31

## 2022-07-30 RX ORDER — SODIUM CHLORIDE 0.9 % (FLUSH) 0.9 %
10 SYRINGE (ML) INJECTION
Status: DISCONTINUED | OUTPATIENT
Start: 2022-07-30 | End: 2022-07-31

## 2022-07-30 RX ORDER — PROCHLORPERAZINE EDISYLATE 5 MG/ML
5 INJECTION INTRAMUSCULAR; INTRAVENOUS EVERY 6 HOURS PRN
Status: DISCONTINUED | OUTPATIENT
Start: 2022-07-30 | End: 2022-07-31

## 2022-07-30 RX ORDER — PRENATAL WITH FERROUS FUM AND FOLIC ACID 3080; 920; 120; 400; 22; 1.84; 3; 20; 10; 1; 12; 200; 27; 25; 2 [IU]/1; [IU]/1; MG/1; [IU]/1; MG/1; MG/1; MG/1; MG/1; MG/1; MG/1; UG/1; MG/1; MG/1; MG/1; MG/1
1 TABLET ORAL DAILY
Status: DISCONTINUED | OUTPATIENT
Start: 2022-07-30 | End: 2022-07-31

## 2022-07-30 RX ORDER — DIPHENHYDRAMINE HCL 25 MG
25 CAPSULE ORAL EVERY 4 HOURS PRN
Status: DISCONTINUED | OUTPATIENT
Start: 2022-07-30 | End: 2022-07-31

## 2022-07-30 RX ADMIN — BETAMETHASONE SODIUM PHOSPHATE AND BETAMETHASONE ACETATE 12 MG: 3; 3 INJECTION, SUSPENSION INTRA-ARTICULAR; INTRALESIONAL; INTRAMUSCULAR at 12:07

## 2022-07-30 RX ADMIN — VANCOMYCIN HYDROCHLORIDE 1500 MG: 1.5 INJECTION, POWDER, LYOPHILIZED, FOR SOLUTION INTRAVENOUS at 01:07

## 2022-07-30 RX ADMIN — SODIUM CHLORIDE, SODIUM LACTATE, POTASSIUM CHLORIDE, AND CALCIUM CHLORIDE 1000 ML: .6; .31; .03; .02 INJECTION, SOLUTION INTRAVENOUS at 12:07

## 2022-07-30 RX ADMIN — SODIUM CHLORIDE, SODIUM LACTATE, POTASSIUM CHLORIDE, AND CALCIUM CHLORIDE: .6; .31; .03; .02 INJECTION, SOLUTION INTRAVENOUS at 01:07

## 2022-07-30 RX ADMIN — NIFEDIPINE 20 MG: 20 CAPSULE ORAL at 12:07

## 2022-07-30 RX ADMIN — VANCOMYCIN HYDROCHLORIDE 1500 MG: 1.5 INJECTION, POWDER, LYOPHILIZED, FOR SOLUTION INTRAVENOUS at 09:07

## 2022-07-30 RX ADMIN — NIFEDIPINE 10 MG: 10 CAPSULE ORAL at 06:07

## 2022-07-30 NOTE — ED TRIAGE NOTES
Pt presents to [facility] c/o bright red bleeding and cramping.  Pt has had 2 previous vaginal deliveries.  Pt denies LOF and endorses FM.  Pt did have sexual intercourse yesterday.  Dr. García aware of pt's arrival.

## 2022-07-30 NOTE — ANESTHESIA PREPROCEDURE EVALUATION
[facility]  Anesthesia Pre-Operative Evaluation         Patient Name: Jolly Collier  YOB: 2000  MRN: [id number]    2022      Jolly Collier is a 21 y.o. female  @ 33w2d who presents with contractions and vaginal bleeding. Denies hx of cardiopulmonary disease, coagulopathy or spinal pathology.     OB History    Para Term  AB Living   4 2 2 0 1 2   SAB IAB Ectopic Multiple Live Births   0 1 0 0 2      # Outcome Date GA Lbr Bean/2nd Weight Sex Delivery Anes PTL Lv   4 Current            3 Term 21 39w1d / 00:05 4 kg (8 lb 13.1 oz) M Vag-Spont None N LUKASZ   2 IAB 2020           1 Term 18 37w0d  3.487 kg (7 lb 11 oz) F Vag-Spont EPI  LUKASZ      Obstetric Comments   Labors: 11 hours & 2 hours       Review of patient's allergies indicates:   Allergen Reactions    Amoxicillin     Penicillins        Wt Readings from Last 1 Encounters:   22 1220 78.5 kg (173 lb)       BP Readings from Last 3 Encounters:   22 104/62   22 112/72   22 118/76       Patient Active Problem List   Diagnosis    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    Pregnancy    Encounter for repeat ultrasound of fetal pyelectasis in [place] pregnancy, antepartum     labor       Past Surgical History:   Procedure Laterality Date    lasix  2020    eyes    WISDOM TOOTH EXTRACTION      no complications       Social History     Socioeconomic History    Marital status: Significant Other     Spouse name: Adonis    Number of children: 2    Years of education: 15    Highest education level: GED or equivalent   Occupational History    Occupation: [employer]   Tobacco Use    Smoking status: Never Smoker    Smokeless tobacco: Never Used   Substance and Sexual Activity    Alcohol use: No    Drug use: No    Sexual activity: Yes     Partners: Male     Birth control/protection: None   Social History Narrative    Menarche  at 12    Lives with FOB- Adonis and children, (2018- Lakesha, 2021-Jimi)    No cats    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     Social Determinants of Health     Financial Resource Strain: Low Risk     Difficulty of Paying Living Expenses: Not hard at all   Food Insecurity: No Food Insecurity    Worried About Running Out of Food in the Last Year: Never true    Ran Out of Food in the Last Year: Never true   Transportation Needs: No Transportation Needs    Lack of Transportation (Medical): No    Lack of Transportation (Non-Medical): No   Physical Activity: Insufficiently Active    Days of Exercise per Week: 2 days    Minutes of Exercise per Session: 30 min   Stress: No Stress Concern Present    Feeling of Stress : Only a little   Social Connections: Unknown    Marital Status: Living with partner   Housing Stability: Low Risk     Unable to Pay for Housing in the Last Year: No    Number of Places Lived in the Last Year: 1    Unstable Housing in the Last Year: No         Chemistry        Component Value Date/Time     04/04/2022 1455    K 4.0 04/04/2022 1455     04/04/2022 1455    CO2 21 (L) 04/04/2022 1455    BUN 8 04/04/2022 1455    CREATININE 0.6 04/04/2022 1455    GLU 92 04/04/2022 1455        Component Value Date/Time    CALCIUM 10.2 04/04/2022 1455    ALKPHOS 94 01/23/2022 1506    AST 14 01/23/2022 1506    ALT 14 01/23/2022 1506    BILITOT 0.3 01/23/2022 1506    ESTGFRAFRICA >60 04/04/2022 1455    EGFRNONAA >60 04/04/2022 1455            Lab Results   Component Value Date    WBC 16.88 (H) 07/30/2022    HGB 12.2 07/30/2022    HCT 36.5 (L) 07/30/2022    MCV 90 07/30/2022     07/30/2022       No results for input(s): PT, INR, PROTIME, APTT in the last 72 hours.        Pre-op Assessment    I have reviewed the Patient Summary Reports.     I have reviewed the Nursing Notes. I have reviewed the NPO Status.      Review of Systems  Anesthesia Hx:  Denies Hx of Anesthetic complications  Denies Family  Hx of Anesthesia complications.    Hematology/Oncology:  Hematology Normal   Oncology Normal     EENT/Dental:EENT/Dental Normal   Cardiovascular:  Cardiovascular Normal     Pulmonary:  Pulmonary Normal    Renal/:  Renal/ Normal     Hepatic/GI:  Hepatic/GI Normal    Musculoskeletal:  Musculoskeletal Normal    Neurological:  Neurology Normal    Endocrine:  Endocrine Normal    Psych:  Psychiatric Normal           Physical Exam  General: Well nourished and Cooperative    Airway:  Mallampati: II   Mouth Opening: Normal  TM Distance: Normal  Tongue: Normal  Neck ROM: Normal ROM    Dental:  Intact    Chest/Lungs:  Normal Respiratory Rate    Heart:  Rate: Normal  Rhythm: Regular Rhythm        Anesthesia Plan  Type of Anesthesia, risks & benefits discussed:    Anesthesia Type: Epidural, Spinal, CSE, Gen ETT  Intra-op Monitoring Plan: Standard ASA Monitors  Post Op Pain Control Plan: epidural analgesia, intrathecal opioid and multimodal analgesia  Informed Consent: Informed consent signed with the Patient and all parties understand the risks and agree with anesthesia plan.  All questions answered.   Day of Surgery Review of History & Physical: H&P Update referred to the surgeon/provider.    Ready For Surgery From Anesthesia Perspective.     .

## 2022-07-30 NOTE — ED PROVIDER NOTES
Encounter Date: 2022       History     Chief Complaint   Patient presents with    Vaginal Bleeding    Cramping     HPI     Jolly Collier is a 21 y.o. [id number] at 33w2d presents complaining of vaginal bleeding. She reports she started having regular, painful contractions every 5 minutes last night that have persisted and then this morning started having some vaginal bleeding that she noticed with wiping that prompted her to come in for evaluation.   This IUP is complicated by HSV.  Patient reports contractions, reports vaginal bleeding, denies LOF.   Fetal Movement: normal.     Review of patient's allergies indicates:   Allergen Reactions    Amoxicillin     Penicillins      Past Medical History:   Diagnosis Date    Herpes 2017    genital     Past Surgical History:   Procedure Laterality Date    lasix  2020    eyes    WISDOM TOOTH EXTRACTION      no complications     Family History   Problem Relation Age of Onset    No Known Problems Mother     Nephrolithiasis Father     Microcephaly Sister         complications,  at 13    No Known Problems Brother     No Known Problems Maternal Grandmother     No Known Problems Maternal Grandfather     Diabetes Paternal Grandmother     No Known Problems Paternal Grandfather     No Known Problems Sister     No Known Problems Brother     No Known Problems Brother     Breast cancer Neg Hx     Colon cancer Neg Hx     Ovarian cancer Neg Hx      Social History     Tobacco Use    Smoking status: Never Smoker    Smokeless tobacco: Never Used   Substance Use Topics    Alcohol use: No    Drug use: No     Review of Systems   Constitutional: Negative for chills and fever.   HENT: Negative for congestion.    Eyes: Negative for visual disturbance.   Respiratory: Negative for cough.    Cardiovascular: Negative for chest pain.   Gastrointestinal: Positive for abdominal pain (contractions ).   Genitourinary: Positive for vaginal bleeding.    Musculoskeletal: Negative for back pain.       Physical Exam     Initial Vitals [07/30/22 1214]   BP Pulse Resp Temp SpO2   104/62 108 18 98.5 °F (36.9 °C) 98 %      MAP       --         Physical Exam    Nursing note and vitals reviewed.  Constitutional: She appears well-developed and well-nourished.   HENT:   Head: Normocephalic and atraumatic.   Eyes: Conjunctivae and EOM are normal. Pupils are equal, round, and reactive to light.   Neck: Neck supple.   Normal range of motion.  Cardiovascular: Normal rate.   Pulmonary/Chest: No respiratory distress.   Genitourinary:    Vagina normal.     Musculoskeletal:         General: Normal range of motion.      Cervical back: Normal range of motion and neck supple.     Neurological: She is alert and oriented to person, place, and time. She has normal strength and normal reflexes.   Skin: Skin is warm and dry.   Psychiatric: She has a normal mood and affect. Her behavior is normal. Judgment and thought content normal.     OB LABOR EXAM:   Pre-Term Labor: No.   Membranes ruptured: No.   Method: Sterile vaginal exam per MD and Sterile speculum exam per MD.   Vaginal Bleeding: bloody show.     Dilatation: 5.   Station: -2.   Effacement: 80%.   Amniotic Fluid Color: no fluid.           ED Course   Fetal non-stress test    Date/Time: 7/30/2022 10:20 PM  Performed by: Mila García MD  Authorized by: Mila García MD     Nonstress Test:     Variability:  6-25 BPM    Decelerations:  None    Accelerations:  15 bpm    Baseline:  150    Contractions:  Regular    Contraction Frequency:  5  Biophysical Profile:     Nonstress Test Interpretation: reactive      Overall Impression:  Reassuring  Post-procedure:     Patient tolerance:  Patient tolerated the procedure well with no immediate complications      Labs Reviewed   CBC W/ AUTO DIFFERENTIAL - Abnormal; Notable for the following components:       Result Value    WBC 16.88 (*)     Hematocrit 36.5 (*)     Gran # (ANC) 13.8 (*)      Immature Grans (Abs) 0.09 (*)     Gran % 81.7 (*)     Lymph % 12.4 (*)     All other components within normal limits   STREP B SCREEN, VAGINAL / RECTAL   SARS-COV-2 RNA AMPLIFICATION, QUAL   TYPE & SCREEN          Imaging Results    None          Medications   sodium chloride 0.9% flush 10 mL (has no administration in time range)   acetaminophen tablet 650 mg (has no administration in time range)   ondansetron disintegrating tablet 8 mg (has no administration in time range)   prochlorperazine injection Soln 5 mg (has no administration in time range)   senna-docusate 8.6-50 mg per tablet 1 tablet (has no administration in time range)   simethicone chewable tablet 80 mg (has no administration in time range)   diphenhydrAMINE capsule 25 mg (has no administration in time range)   diphenhydrAMINE injection 25 mg (has no administration in time range)   prenatal vitamin oral tablet (0 tablets Oral Hold 22 1300)   betamethasone acetate-betamethasone sodium phosphate injection 12 mg (12 mg Intramuscular Given 22 1216)   NIFEdipine capsule 10 mg (10 mg Oral Given 22 1824)   vancomycin 1.5 g in dextrose 5 % 250 mL IVPB (ready to mix) (0 mg Intravenous Stopped 22 1447)   lactated ringers infusion ( Intravenous New Bag 22 1321)   NIFEdipine capsule 20 mg (20 mg Oral Given 22 1214)   lactated ringers bolus 1,000 mL (0 mLs Intravenous Stopped 22 1327)     Medical Decision Making:   ED Management:  VSS   NST RR   [place]: Q 5 minutes  SSE: + vaginal bleeding, no active bleeding on exam, no active HSV lesions   SVE /-2  Consents signed   Vertex by BSUS   BMZ 2 administered   Discussed with MFM on call. Admit to L&D for continuous monitoring and tocolysis, BMZ course in the settingo f  labor. See H&P for full details.   Other:   I have discussed this case with another health care provider.            Attending Attestation:   Physician Attestation Statement for Resident:  As the  supervising MD   Physician Attestation Statement: I have personally seen and examined this patient.   I agree with the above history. -:   As the supervising MD I agree with the above PE.    As the supervising MD I agree with the above treatment, course, plan, and disposition.   -: 20 yo  at 33w2d in  labor.  Will admit to L&D.  Discussed plan of care with patient and partner who are in agreement.    I was personally present during the critical portions of the procedure(s) performed by the resident and was immediately available in the ED to provide services and assistance as needed during the entire procedure.  I have reviewed and agree with the residents interpretation of the following: rhythm strips.  I have reviewed the following: old records at this facility.                         Clinical Impression:   Final diagnoses:  [O60.00]  labor          ED Disposition Condition    Send to L&D               Samanta Lemon MD  22 0134

## 2022-07-30 NOTE — CARE UPDATE
Baseline 145, cat 1, q3-4    MD to bedside for routine SVE     Pt reports continued contractions every 5 minutes, but states they are becoming more intense. Reports continued vaginal bleeding that she notices when wiping but no big gushes of blood or fluid, FM normal     SVE 5/90/-1, BBOW   NST baseline 145, moderate BTBV, +accels, no decels, cat 1   [place]: Q4-5 minutes     Plan:   -continuous monitoring   -recheck in 4 hours or PRN, can space more if contractions slow     Mila García MD   PGY-3, OB-GYN

## 2022-07-30 NOTE — PROGRESS NOTES
[facility]  Obstetrics  Labor Progress Note    Patient Name: Jolly Collier  MRN: [id number]  Admission Date: 2022  Hospital Length of Stay: 0 days  Attending Physician: Wendy D. Gerhardt, CNM  Primary Care Provider: Jayshree Farmer MD    Subjective:     Principal Problem: labor    Interval History:  Jolly is a 21 y.o.  at 33w2d. She was admitted for  labor. Cervix was 5 cm on admission. She was started on tocolysis, and vancomycin for GBS unknown. She was given betamethasone.   Currently still reports contractions though they have improved.     Objective:     Vital Signs (Most Recent):  Temp: 97.9 °F (36.6 °C) (22 1713)  Pulse: 103 (22 1713)  Resp: 18 (22 1550)  BP: 108/60 (22 1713)  SpO2: 98 % (22 1625) Vital Signs (24h Range):  Temp:  [97.9 °F (36.6 °C)-98.5 °F (36.9 °C)] 97.9 °F (36.6 °C)  Pulse:  [103-127] 103  Resp:  [18] 18  SpO2:  [97 %-100 %] 98 %  BP: (104-116)/(57-62) 108/60     Weight: 78.5 kg (173 lb 1 oz)  Body mass index is 32.7 kg/m².    FHT: 150, moderate variability, + accels, no decels  TOCO:  Q 3-5 minutes    Physical Exam         Significant Labs:  Lab Results   Component Value Date    GROUPTRH O POS 2022    HEPBSAG Negative 2022    STREPBCULT No Group B Streptococcus isolated 2021       I have personallly reviewed all pertinent lab results from the last 24 hours.    Assessment/Plan:     21 y.o. female  at 33w2d for:    Active Diagnoses:    Diagnosis Date Noted POA    PRINCIPAL PROBLEM:   labor [O60.00] 2022 Yes      Problems Resolved During this Admission:     Continue with inpatient management  GBS unknown continue vanco  Cephalic, if unstoppable PTL, then anticipate .   Discussed given 33 weeks NICU admission highly likely  Continue tocolysis if BP's tolerate  Category 1 tracing    Omar Cheung MD  Obstetrics  [facility]

## 2022-07-30 NOTE — H&P
HISTORY AND PHYSICAL                                                OBSTETRICS          Subjective:       Jolly Collier is a 21 y.o.  female with IUP at 33w2d weeks gestation who presents with  labor.    Patient reports contractions, reports vaginal bleeding, denies LOF.   Fetal Movement: normal.    This IUP is complicated by HSV.    Review of Systems   Constitutional: Negative for chills and fever.   Respiratory: Negative for cough and shortness of breath.    Cardiovascular: Negative for chest pain.   Gastrointestinal: Positive for abdominal pain (contractions ). Negative for nausea and vomiting.   Endocrine: Negative for diabetes.   Genitourinary: Positive for vaginal bleeding and vaginal discharge.   Musculoskeletal: Negative for back pain.   Neurological: Negative for headaches.   Psychiatric/Behavioral: Negative for depression.       PMHx:   Past Medical History:   Diagnosis Date    Herpes 2017    genital       PSHx:   Past Surgical History:   Procedure Laterality Date    lasix      eyes    WISDOM TOOTH EXTRACTION      no complications       All:   Review of patient's allergies indicates:   Allergen Reactions    Amoxicillin     Penicillins        Meds: (Not in a hospital admission)      SH:   Social History     Socioeconomic History    Marital status: Significant Other     Spouse name: Adonis    Number of children: 2    Years of education: 15    Highest education level: GED or equivalent   Occupational History    Occupation: [employer]   Tobacco Use    Smoking status: Never Smoker    Smokeless tobacco: Never Used   Substance and Sexual Activity    Alcohol use: No    Drug use: No    Sexual activity: Yes     Partners: Male     Birth control/protection: None   Social History Narrative    Menarche at 12    Lives with FOB- Adonis and children, (- Lakesha, -em)    No cats    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     Social Determinants of Health     Financial Resource Strain: Low Risk      Difficulty of Paying Living Expenses: Not hard at all   Food Insecurity: No Food Insecurity    Worried About Running Out of Food in the Last Year: Never true    Ran Out of Food in the Last Year: Never true   Transportation Needs: No Transportation Needs    Lack of Transportation (Medical): No    Lack of Transportation (Non-Medical): No   Physical Activity: Insufficiently Active    Days of Exercise per Week: 2 days    Minutes of Exercise per Session: 30 min   Stress: No Stress Concern Present    Feeling of Stress : Only a little   Social Connections: Unknown    Marital Status: Living with partner   Housing Stability: Low Risk     Unable to Pay for Housing in the Last Year: No    Number of Places Lived in the Last Year: 1    Unstable Housing in the Last Year: No       FH:   Family History   Problem Relation Age of Onset    No Known Problems Mother     Nephrolithiasis Father     Microcephaly Sister         complications,  at 13    No Known Problems Brother     No Known Problems Maternal Grandmother     No Known Problems Maternal Grandfather     Diabetes Paternal Grandmother     No Known Problems Paternal Grandfather     No Known Problems Sister     No Known Problems Brother     No Known Problems Brother     Breast cancer Neg Hx     Colon cancer Neg Hx     Ovarian cancer Neg Hx        OBHx:   OB History    Para Term  AB Living   4 2 2 0 1 2   SAB IAB Ectopic Multiple Live Births   0 1 0 0 2      # Outcome Date GA Lbr Bean/2nd Weight Sex Delivery Anes PTL Lv   4 Current            3 Term 21 39w1d / 00:05 4 kg (8 lb 13.1 oz) M Vag-Spont None N LUKASZ      Name: Radha      Apgar1: 9  Apgar5: 9   2 IAB 2020           1 Term 18 37w0d  3.487 kg (7 lb 11 oz) F Vag-Spont EPI  LUKASZ      Name: Lakesha      Obstetric Comments   Labors: 11 hours & 2 hours       Objective:       LMP 2021 (Approximate)     There were no vitals filed for this visit.    General:   alert, appears stated age and  cooperative, no apparent distress   HENT:  normocephalic, atraumatic   Eyes:  extraocular movements and conjunctivae normal   Neck:  supple, range of motion normal, no thyromegaly   Lungs:   no respiratory distress   Heart:   regular rate   Abdomen:  soft, non-tender, non-distended but gravid, no rebound or guarding    Extremities negative edema, negative erythema   FHT: Baseline 150s, moderate BTBV, +accels, -decels;  Cat 1 (reassuring)                 TOCO: Q 5 minutes   Presentations: cephalic by ultrasound   Cervix:     Dilation: 5 cm     Effacement: 80%    Station:  -2    Consistency: soft    Position: middle   Sterile Speculum Exam: no evidence of active HSV lesions. Moderate amount of bloody mucus discharge throughout the vault, no active bleeding on exam.     EFW by Leopold's: 4 lbs      Lab Review  Blood Type O POS  GBBS: negative  Rubella: Immune  RPR: NR  HIV: negative  HepB: negative       Assessment:       33w2d weeks gestation with  labor.     There are no hospital problems to display for this patient.         Plan:      1.  labor   - Risks, benefits, alternatives and possible complications have been discussed in detail with the patient.   - Consents signed and to chart  - Admit to Labor and Delivery unit  - GBS collected on admit, will start vancomycin for ppx given PCN allergy    - Epidural per Anesthesia  - Draw CBC, T&S  - Notify Staff  - Recheck in 4 hrs or PRN  - EFW 4 lbs    Post-Partum Hemorrhage risk - low    2. HSV   - negative SSE on admit   - Valtrex ppx ordered, but not yet started     Mila García MD   PGY-3, OB-GYN

## 2022-07-31 LAB
ALLENS TEST: ABNORMAL
ALLENS TEST: ABNORMAL
BASOPHILS # BLD AUTO: 0.04 K/UL (ref 0–0.2)
BASOPHILS NFR BLD: 0.1 % (ref 0–1.9)
DELSYS: ABNORMAL
DELSYS: ABNORMAL
DIFFERENTIAL METHOD: ABNORMAL
EOSINOPHIL # BLD AUTO: 0 K/UL (ref 0–0.5)
EOSINOPHIL NFR BLD: 0 % (ref 0–8)
ERYTHROCYTE [DISTWIDTH] IN BLOOD BY AUTOMATED COUNT: 13.2 % (ref 11.5–14.5)
GIANT PLATELETS BLD QL SMEAR: PRESENT
HCO3 UR-SCNC: 22.4 MMOL/L (ref 24–28)
HCO3 UR-SCNC: 23.9 MMOL/L (ref 24–28)
HCT VFR BLD AUTO: 33.6 % (ref 37–48.5)
HCT VFR BLD CALC: 46 %PCV (ref 36–54)
HCT VFR BLD CALC: 52 %PCV (ref 36–54)
HGB BLD-MCNC: 11.3 G/DL (ref 12–16)
HGB BLD-MCNC: 16 G/DL
HGB BLD-MCNC: 18 G/DL
IMM GRANULOCYTES # BLD AUTO: 0.18 K/UL (ref 0–0.04)
IMM GRANULOCYTES NFR BLD AUTO: 0.6 % (ref 0–0.5)
LYMPHOCYTES # BLD AUTO: 1.7 K/UL (ref 1–4.8)
LYMPHOCYTES NFR BLD: 6 % (ref 18–48)
MCH RBC QN AUTO: 30.5 PG (ref 27–31)
MCHC RBC AUTO-ENTMCNC: 33.6 G/DL (ref 32–36)
MCV RBC AUTO: 91 FL (ref 82–98)
MONOCYTES # BLD AUTO: 1.9 K/UL (ref 0.3–1)
MONOCYTES NFR BLD: 6.7 % (ref 4–15)
NEUTROPHILS # BLD AUTO: 24.2 K/UL (ref 1.8–7.7)
NEUTROPHILS NFR BLD: 86.6 % (ref 38–73)
NRBC BLD-RTO: 0 /100 WBC
PCO2 BLDA: 35.5 MMHG (ref 35–45)
PCO2 BLDA: 48.4 MMHG (ref 35–45)
PH SMN: 7.3 [PH] (ref 7.35–7.45)
PH SMN: 7.41 [PH] (ref 7.35–7.45)
PLATELET # BLD AUTO: 181 K/UL (ref 150–450)
PLATELET BLD QL SMEAR: ABNORMAL
PMV BLD AUTO: 12.8 FL (ref 9.2–12.9)
PO2 BLDA: 20 MMHG (ref 80–100)
PO2 BLDA: 25 MMHG (ref 80–100)
POC BE: -2 MMOL/L
POC BE: -3 MMOL/L
POC IONIZED CALCIUM: 1.52 MMOL/L (ref 1.06–1.42)
POC IONIZED CALCIUM: 1.54 MMOL/L (ref 1.06–1.42)
POC SATURATED O2: 26 % (ref 95–100)
POC SATURATED O2: 47 % (ref 95–100)
POC TCO2: 23 MMOL/L (ref 23–27)
POC TCO2: 25 MMOL/L (ref 23–27)
POTASSIUM BLD-SCNC: 4.6 MMOL/L (ref 3.5–5.1)
POTASSIUM BLD-SCNC: 5.2 MMOL/L (ref 3.5–5.1)
RBC # BLD AUTO: 3.71 M/UL (ref 4–5.4)
SAMPLE: ABNORMAL
SAMPLE: ABNORMAL
SITE: ABNORMAL
SITE: ABNORMAL
SODIUM BLD-SCNC: 136 MMOL/L (ref 136–145)
SODIUM BLD-SCNC: 137 MMOL/L (ref 136–145)
WBC # BLD AUTO: 27.92 K/UL (ref 3.9–12.7)

## 2022-07-31 PROCEDURE — 72100003 HC LABOR CARE, EA. ADDL. 8 HRS

## 2022-07-31 PROCEDURE — 63600175 PHARM REV CODE 636 W HCPCS

## 2022-07-31 PROCEDURE — 99232 PR SUBSEQUENT HOSPITAL CARE,LEVL II: ICD-10-PCS | Mod: ,,, | Performed by: STUDENT IN AN ORGANIZED HEALTH CARE EDUCATION/TRAINING PROGRAM

## 2022-07-31 PROCEDURE — 85025 COMPLETE CBC W/AUTO DIFF WBC: CPT | Performed by: STUDENT IN AN ORGANIZED HEALTH CARE EDUCATION/TRAINING PROGRAM

## 2022-07-31 PROCEDURE — 25000003 PHARM REV CODE 250: Performed by: STUDENT IN AN ORGANIZED HEALTH CARE EDUCATION/TRAINING PROGRAM

## 2022-07-31 PROCEDURE — 72200005 HC VAGINAL DELIVERY LEVEL II

## 2022-07-31 PROCEDURE — 36416 COLLJ CAPILLARY BLOOD SPEC: CPT

## 2022-07-31 PROCEDURE — 25000003 PHARM REV CODE 250

## 2022-07-31 PROCEDURE — 88307 PR  SURG PATH,LEVEL V: ICD-10-PCS | Mod: 26,,, | Performed by: STUDENT IN AN ORGANIZED HEALTH CARE EDUCATION/TRAINING PROGRAM

## 2022-07-31 PROCEDURE — 99900035 HC TECH TIME PER 15 MIN (STAT)

## 2022-07-31 PROCEDURE — 11000001 HC ACUTE MED/SURG PRIVATE ROOM

## 2022-07-31 PROCEDURE — 99232 SBSQ HOSP IP/OBS MODERATE 35: CPT | Mod: ,,, | Performed by: STUDENT IN AN ORGANIZED HEALTH CARE EDUCATION/TRAINING PROGRAM

## 2022-07-31 PROCEDURE — 36415 COLL VENOUS BLD VENIPUNCTURE: CPT | Performed by: STUDENT IN AN ORGANIZED HEALTH CARE EDUCATION/TRAINING PROGRAM

## 2022-07-31 PROCEDURE — 72100002 HC LABOR CARE, 1ST 8 HOURS

## 2022-07-31 PROCEDURE — 99231 PR SUBSEQUENT HOSPITAL CARE,LEVL I: ICD-10-PCS | Mod: ,,,

## 2022-07-31 PROCEDURE — 88307 TISSUE EXAM BY PATHOLOGIST: CPT | Mod: 26,,, | Performed by: STUDENT IN AN ORGANIZED HEALTH CARE EDUCATION/TRAINING PROGRAM

## 2022-07-31 PROCEDURE — 88307 TISSUE EXAM BY PATHOLOGIST: CPT | Performed by: STUDENT IN AN ORGANIZED HEALTH CARE EDUCATION/TRAINING PROGRAM

## 2022-07-31 PROCEDURE — 82803 BLOOD GASES ANY COMBINATION: CPT

## 2022-07-31 PROCEDURE — 99231 SBSQ HOSP IP/OBS SF/LOW 25: CPT | Mod: ,,,

## 2022-07-31 RX ORDER — LIDOCAINE HYDROCHLORIDE 10 MG/ML
10 INJECTION INFILTRATION; PERINEURAL ONCE
Status: COMPLETED | OUTPATIENT
Start: 2022-07-31 | End: 2022-07-31

## 2022-07-31 RX ORDER — IBUPROFEN 600 MG/1
600 TABLET ORAL EVERY 6 HOURS
Status: DISCONTINUED | OUTPATIENT
Start: 2022-07-31 | End: 2022-08-01 | Stop reason: HOSPADM

## 2022-07-31 RX ORDER — HYDROCODONE BITARTRATE AND ACETAMINOPHEN 5; 325 MG/1; MG/1
1 TABLET ORAL EVERY 4 HOURS PRN
Status: DISCONTINUED | OUTPATIENT
Start: 2022-07-31 | End: 2022-08-01 | Stop reason: HOSPADM

## 2022-07-31 RX ORDER — DOCUSATE SODIUM 100 MG/1
200 CAPSULE, LIQUID FILLED ORAL 2 TIMES DAILY PRN
Status: DISCONTINUED | OUTPATIENT
Start: 2022-07-31 | End: 2022-08-01 | Stop reason: HOSPADM

## 2022-07-31 RX ORDER — OXYTOCIN/RINGER'S LACTATE 30/500 ML
95 PLASTIC BAG, INJECTION (ML) INTRAVENOUS ONCE
Status: DISCONTINUED | OUTPATIENT
Start: 2022-07-31 | End: 2022-08-01 | Stop reason: HOSPADM

## 2022-07-31 RX ORDER — SODIUM CHLORIDE 0.9 % (FLUSH) 0.9 %
10 SYRINGE (ML) INJECTION
Status: DISCONTINUED | OUTPATIENT
Start: 2022-07-31 | End: 2022-08-01 | Stop reason: HOSPADM

## 2022-07-31 RX ORDER — DIPHENHYDRAMINE HYDROCHLORIDE 50 MG/ML
25 INJECTION INTRAMUSCULAR; INTRAVENOUS EVERY 4 HOURS PRN
Status: DISCONTINUED | OUTPATIENT
Start: 2022-07-31 | End: 2022-08-01 | Stop reason: HOSPADM

## 2022-07-31 RX ORDER — PRENATAL WITH FERROUS FUM AND FOLIC ACID 3080; 920; 120; 400; 22; 1.84; 3; 20; 10; 1; 12; 200; 27; 25; 2 [IU]/1; [IU]/1; MG/1; [IU]/1; MG/1; MG/1; MG/1; MG/1; MG/1; MG/1; UG/1; MG/1; MG/1; MG/1; MG/1
1 TABLET ORAL DAILY
Status: DISCONTINUED | OUTPATIENT
Start: 2022-07-31 | End: 2022-08-01 | Stop reason: HOSPADM

## 2022-07-31 RX ORDER — ONDANSETRON 8 MG/1
8 TABLET, ORALLY DISINTEGRATING ORAL EVERY 8 HOURS PRN
Status: DISCONTINUED | OUTPATIENT
Start: 2022-07-31 | End: 2022-08-01 | Stop reason: HOSPADM

## 2022-07-31 RX ORDER — ACETAMINOPHEN 325 MG/1
650 TABLET ORAL EVERY 6 HOURS PRN
Status: DISCONTINUED | OUTPATIENT
Start: 2022-07-31 | End: 2022-08-01 | Stop reason: HOSPADM

## 2022-07-31 RX ORDER — SIMETHICONE 80 MG
1 TABLET,CHEWABLE ORAL EVERY 6 HOURS PRN
Status: DISCONTINUED | OUTPATIENT
Start: 2022-07-31 | End: 2022-08-01 | Stop reason: HOSPADM

## 2022-07-31 RX ORDER — HYDROCODONE BITARTRATE AND ACETAMINOPHEN 10; 325 MG/1; MG/1
1 TABLET ORAL EVERY 4 HOURS PRN
Status: DISCONTINUED | OUTPATIENT
Start: 2022-07-31 | End: 2022-08-01 | Stop reason: HOSPADM

## 2022-07-31 RX ORDER — HYDROCORTISONE 25 MG/G
CREAM TOPICAL 3 TIMES DAILY PRN
Status: DISCONTINUED | OUTPATIENT
Start: 2022-07-31 | End: 2022-08-01 | Stop reason: HOSPADM

## 2022-07-31 RX ORDER — PROCHLORPERAZINE EDISYLATE 5 MG/ML
5 INJECTION INTRAMUSCULAR; INTRAVENOUS EVERY 6 HOURS PRN
Status: DISCONTINUED | OUTPATIENT
Start: 2022-07-31 | End: 2022-08-01 | Stop reason: HOSPADM

## 2022-07-31 RX ORDER — DIPHENHYDRAMINE HCL 25 MG
25 CAPSULE ORAL EVERY 4 HOURS PRN
Status: DISCONTINUED | OUTPATIENT
Start: 2022-07-31 | End: 2022-08-01 | Stop reason: HOSPADM

## 2022-07-31 RX ADMIN — HYDROCODONE BITARTRATE AND ACETAMINOPHEN 1 TABLET: 5; 325 TABLET ORAL at 12:07

## 2022-07-31 RX ADMIN — LIDOCAINE HYDROCHLORIDE 10 ML: 10 INJECTION INFILTRATION; PERINEURAL at 12:07

## 2022-07-31 RX ADMIN — DOCUSATE SODIUM 200 MG: 100 CAPSULE, LIQUID FILLED ORAL at 01:07

## 2022-07-31 RX ADMIN — LIDOCAINE HYDROCHLORIDE 10 ML: 10 INJECTION, SOLUTION INFILTRATION; PERINEURAL at 12:07

## 2022-07-31 RX ADMIN — PRENATAL VIT W/ FE FUMARATE-FA TAB 27-0.8 MG 1 TABLET: 27-0.8 TAB at 08:07

## 2022-07-31 RX ADMIN — Medication 30 UNITS: at 12:07

## 2022-07-31 RX ADMIN — IBUPROFEN 600 MG: 600 TABLET ORAL at 01:07

## 2022-07-31 RX ADMIN — IBUPROFEN 600 MG: 600 TABLET ORAL at 10:07

## 2022-07-31 RX ADMIN — IBUPROFEN 600 MG: 600 TABLET ORAL at 06:07

## 2022-07-31 NOTE — PLAN OF CARE
VSS. Pt ambulating and voiding without difficulty. Patient safety maintained, side rails up, bed low and locked position.  Pain well controlled with PRN pain medication. Fundus midline, firm, with moderate lochia. Significant other at bedside; attentive to pt. Will continue to monitor and intervene as necessary.

## 2022-07-31 NOTE — PROGRESS NOTES
POSTPARTUM PROGRESS NOTE    Subjective:     PPD/POD#: 1   Procedure:    EGA: 33w2d   N/V: No   F/C: No   Abd Pain: Mild, well-controlled with oral pain medication   Lochia: Mild   Voiding: Yes   Ambulating: Yes   Bowel fnc: No   Breastfeeding: Yes   Contraception: Copper IUD at pp appointment    Circumcision: N/A, baby in NICU     Objective:      Temp:  [97.7 °F (36.5 °C)-98.5 °F (36.9 °C)] 98.5 °F (36.9 °C)  Pulse:  [] 127  Resp:  [16-18] 18  SpO2:  [88 %-100 %] 97 %  BP: ()/(53-69) 117/63    Lung: Normal respiratory effort   Abdomen: Soft, appropriately tender   Uterus: Firm, no fundal tenderness   Incision: N/A   : Deferred   Extremities: Bilateral trace edema     Lab Review    No results for input(s): NA, K, CL, CO2, BUN, CREATININE, GLU, PROT, BILITOT, ALKPHOS, ALT, AST, MG, PHOS in the last 168 hours.    Recent Labs   Lab 22  1153 22  0010 22  0011 22  0354   WBC 16.88*  --   --  27.92*   HGB 12.2  --   --  11.3*   HCT 36.5* 52 46 33.6*   MCV 90  --   --  91     --   --  181         I/O    Intake/Output Summary (Last 24 hours) at 2022 0507  Last data filed at 2022 0245  Gross per 24 hour   Intake --   Output 400 ml   Net -400 ml        Assessment and Plan:   Postpartum care:  - Patient doing well.  - Continue routine management and advances.      Mila García MD   PGY-3, OB-GYN

## 2022-07-31 NOTE — LACTATION NOTE
LC visit to room to review pumping for an NICU baby. Gave mother NICU Mother's Breastfeeding Guide. Showed mother how to work pump, how to keep track of pumpings, how to label nicu breastmilk, how to clean pump parts and bring milk to NICU even if it is only a drop of milk. NICU uses mother's milk for mouth care so even small amounts are ok to bring to NICU. Mother aware to pump 8 or more times a day. Showed mother how to use Symphony pump on initiate setting. Call RN with any further questions.Mother may want to start thinking about what she will pump with when she gets home. May need to call insurance for pump options.      07/31/22 1309   Maternal Assessment   Breast Shape Bilateral:;round   Breast Density Bilateral:;soft   Areola Bilateral:;elastic   Nipples Bilateral:;everted   Maternal Infant Feeding   Maternal Emotional State relaxed   Equipment Type   Breast Pump Type double electric, hospital grade   Breast Pump Flange Type hard   Breast Pump Flange Size 24 mm   Breast Pumping   Breast Pumping Interventions frequent pumping encouraged   Breast Pumping double electric breast pump utilized;pre-pumping hand expression   Lactation Referrals   Lactation Referrals outpatient lactation program;support group

## 2022-07-31 NOTE — LACTATION NOTE
CinnaBidhony pump, tubing, collections containers and labels brought to bedside.  Discussed proper pump setting of initiation phase.  Instructed on proper usage of pump and to adjust suction according to maximum comfort level.  Verified appropriate flange fit.  Educated on the frequency and duration of pumping in order to promote and maintain a full milk supply.  Hands on pumping technique reviewed.  Encouraged hand expression after pumping.  Instructed on cleaning of breast pump parts.  Written instructions also given.  Pt verbalized understanding and appropriate recall for proper milk handling, collection, labeling, storage and transportation.

## 2022-07-31 NOTE — CARE UPDATE
MD to bedside for SVE due to concern for SROM     On my arrival in room, pt ambulating independently from the bathroom. Reports she thinks that she may have urinated on herself as she felt a strong urge to urinate, stood up, then had a contraction and then began to void.     SVE 6-7/90/-1, BOW palpated and felt to be intact, minimal blood on glove  NST baseline 140s, moderate BTBV, cat 1   [place]: Q4-5 minutes    Plan:   Recheck PRN  Continue tocolysis with nifedipine   BMZ 1/2 administered at 1215, redose at 24 hours if still pregnant     Mila García MD   PGY-3, OB-GYN

## 2022-07-31 NOTE — L&D DELIVERY NOTE
[facility] - Labor & Delivery  Vaginal Delivery   Operative Note    SUMMARY   Patient called out with increasing pain. Patient found to be 9 cm with bulging bag of water. Decision made to move to OR. Patient was set in stirrups. No cervix palpable on exam. Decision was made to proceed with delivery. AROM clear.     After 3 sets of maternal pushes:    Normal spontaneous vaginal delivery of live infant, was placed on mothers abdomen for skin to skin and bulb suctioning performed. Infant then taken to warmer for evaluation by NICU secondary to prematurity.  Infant delivered position JAZ over intact perineum.  Nuchal cord: No.    Spontaneous delivery of placenta and IV pitocin given noting good uterine tone.  Left vaginal laceration noted. 3 cc lidocaine injected for local anesthetic. Vaginal laceration repaired with running sutures of 3-0 vicyrl.    Patient tolerated delivery well. Sponge needle and lap counted correctly x2.     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    Pregnancy    Encounter for repeat ultrasound of fetal pyelectasis in [place] pregnancy, antepartum     labor     (spontaneous vaginal delivery)     Admitting GA: 33w3d    Delivery Information for Carlos Eduardo Collier    Birth information:  YOB: 2023   Time of birth: 11:53 PM   Sex: male   Head Delivery Date/Time: 2022 11:53 PM   Delivery type: Vaginal, Spontaneous   Gestational Age: 33w2d    Delivery Providers    Delivering clinician: Omar Cheung MD   Provider Role    Dorothy Mtz RN Charge Nurse    Jayshree Pino RN Registered Nurse    Katia Wilson RN Registered Nurse    Samanta Arias [facility] Shanita Boyle MD             Measurements    Weight: 2.5 kg  Length:          Apgars    Living  status: Living  Apgars:  1 min.:  5 min.:  10 min.:  15 min.:  20 min.:    Skin color:  0  0       Heart rate:  2  2       Reflex irritability:  2  2       Muscle tone:  2  2       Respiratory effort:  2  2       Total:  8  8       Apgars assigned by: NICU         Operative Delivery    Forceps attempted?: No  Vacuum extractor attempted?: No         Shoulder Dystocia    Shoulder dystocia present?: No           Presentation    Presentation: Vertex  Position: Left Occiput Anterior           Interventions/Resuscitation    Method: NICU Attended       Cord    Vessels: 3 vessels  Complications: None  Delayed Cord Clamping?: Yes  Cord Clamped Date/Time: 2022 11:54 PM  Cord Blood Disposition: Sent with Baby  Gases Sent?: Yes  Stem Cell Collection (by MD): No       Placenta    Placenta delivery date/time: 2022  Placenta removal: Spontaneous  Placenta appearance: Intact  Placenta disposition: pathology           Labor Events:       labor: Yes     Labor Onset Date/Time:         Dilation Complete Date/Time:         Start Pushing Date/Time:         Start Pushing Date/Time:       Rupture Date/Time: 22         Rupture type:          Fluid Amount:       Fluid Color: Clear      Fluid Odor:       Membrane Status: ARM (Artificial Rupture)               steroids: Partial Course     Antibiotics given for GBS: Yes     Induction: none     Indications for induction:        Augmentation:       Indications for augmentation:       Labor complications: None     Additional complications:          Cervical ripening:                     Delivery:      Episiotomy: None     Indication for Episiotomy:       Perineal Lacerations: None Repaired:      Periurethral Laceration:   Repaired:     Labial Laceration:   Repaired:     Sulcus Laceration:   Repaired:     Vaginal Laceration: Yes Repaired: Yes   Cervical Laceration:   Repaired:     Repair suture:       Repair # of packets: 1     Last Value - EBL - Nursing  (mL):       Sum - EBL - Nursing (mL): 0     Last Value - EBL - Anesthesia (mL):      Calculated QBL (mL):       Vaginal Sweep Performed:       Surgicount Correct: Yes       Other providers:       Anesthesia    Method: None          Details (if applicable):  Trial of Labor      Categorization:      Priority:     Indications for :     Incision Type:       Additional  information:  Forceps:    Vacuum:    Breech:    Observed anomalies    Other (Comments):

## 2022-07-31 NOTE — PLAN OF CARE
Ambulating independently; voiding without difficulty; fundus firm, midline, down 1 cm; moderate rubra no clots; pain controlled with around the clock dosing; using double electric breast pump; Patient bonding well with infant in NICU.  Will continue to monitor closely.

## 2022-08-01 VITALS
BODY MASS INDEX: 32.67 KG/M2 | HEIGHT: 61 IN | OXYGEN SATURATION: 96 % | RESPIRATION RATE: 18 BRPM | TEMPERATURE: 98 F | SYSTOLIC BLOOD PRESSURE: 118 MMHG | WEIGHT: 173.06 LBS | HEART RATE: 104 BPM | DIASTOLIC BLOOD PRESSURE: 69 MMHG

## 2022-08-01 LAB — RPR SER QL: NORMAL

## 2022-08-01 PROCEDURE — 99238 PR HOSPITAL DISCHARGE DAY,<30 MIN: ICD-10-PCS | Mod: ,,, | Performed by: ADVANCED PRACTICE MIDWIFE

## 2022-08-01 PROCEDURE — 25000003 PHARM REV CODE 250: Performed by: STUDENT IN AN ORGANIZED HEALTH CARE EDUCATION/TRAINING PROGRAM

## 2022-08-01 PROCEDURE — 99238 HOSP IP/OBS DSCHRG MGMT 30/<: CPT | Mod: ,,, | Performed by: ADVANCED PRACTICE MIDWIFE

## 2022-08-01 RX ORDER — IBUPROFEN 600 MG/1
600 TABLET ORAL EVERY 6 HOURS
Qty: 30 TABLET | Refills: 1 | Status: CANCELLED | OUTPATIENT
Start: 2022-08-01

## 2022-08-01 RX ORDER — DOCUSATE SODIUM 100 MG/1
200 CAPSULE, LIQUID FILLED ORAL 2 TIMES DAILY PRN
Qty: 30 CAPSULE | Refills: 1 | Status: CANCELLED | OUTPATIENT
Start: 2022-08-01

## 2022-08-01 RX ADMIN — SIMETHICONE 80 MG: 80 TABLET, CHEWABLE ORAL at 12:08

## 2022-08-01 RX ADMIN — SIMETHICONE 80 MG: 80 TABLET, CHEWABLE ORAL at 05:08

## 2022-08-01 RX ADMIN — DOCUSATE SODIUM 200 MG: 100 CAPSULE, LIQUID FILLED ORAL at 05:08

## 2022-08-01 RX ADMIN — IBUPROFEN 600 MG: 600 TABLET ORAL at 12:08

## 2022-08-01 RX ADMIN — IBUPROFEN 600 MG: 600 TABLET ORAL at 05:08

## 2022-08-01 RX ADMIN — PRENATAL VIT W/ FE FUMARATE-FA TAB 27-0.8 MG 1 TABLET: 27-0.8 TAB at 10:08

## 2022-08-01 NOTE — PLAN OF CARE
Discharge goals met; AVS given to patient and reviewed including warning signs/when to notify her CNM/PCP, and when to schedule her follow-up appointments.  Patient verbalized understanding and denied additional questions.  Patient discharged to home in stable condition in care of spouse.

## 2022-08-01 NOTE — PLAN OF CARE
VSS. Pt ambulating and voiding without difficulty. Patient safety maintained, side rails up, bed low and locked position.  Pain well controlled with PRN pain medication. Fundus midline, firm, with moderate lochia. Significant other at bedside; visiting infant in NICU. Pump at bedside. Will continue to monitor and intervene as necessary.

## 2022-08-01 NOTE — LACTATION NOTE
LC did DC teaching for NICU mother pumping for her baby. Mother has NICU Mother's Breastfeeding Guide. Reviewed how to work pump, how to keep track of pumpings, how to label nicu breastmilk, how to clean pump parts and bring milk to NICU even if it is only a drop of milk. NICU uses mother's milk for mouth care so even small amounts are ok to bring to NICU. Mother aware to pump 8 or more times a day for 15-20 minutes. Mother is aware of proper techniques for transporting her breastmilk and is aware of the written instructions in her Mother's NICU Breastfeeding Guide. Mother is using a Pump In Style pump at home and is aware that she can use the Symphony breastpump at the baby's bedside when she visits. Mother declined rental. Mother has the [facility] phone number and the [facility] phone number to call for further questions.

## 2022-08-02 NOTE — SUBJECTIVE & OBJECTIVE
Interval History:     Doing well, ambulating, voiding, and tolerating regular diet  Lochia: steadily decreasing  Pain: well controlled with PO ibuprofen  Breasts/nipples: Pumping breastmilk for her son in NICU  Depression/anxiety: Denies   Support at home: Yes  Contraception: considering Paragard; understands that progesterone only options are appropriate with breastfeeding  : Baby boy is in NICU ()       Objective:     Vital Signs (Most Recent):  Temp: 97.7 °F (36.5 °C) (22 1633)  Pulse: 104 (22 1633)  Resp: 18 (22 1633)  BP: 118/69 (22 1633)  SpO2: 96 % (22 1633) Vital Signs (24h Range):  Temp:  [97.7 °F (36.5 °C)-98.1 °F (36.7 °C)] 97.7 °F (36.5 °C)  Pulse:  [102-106] 104  Resp:  [16-18] 18  SpO2:  [96 %-98 %] 96 %  BP: (104-126)/(51-78) 118/69     Weight: 78.5 kg (173 lb 1 oz)  Body mass index is 32.7 kg/m².    No intake or output data in the 24 hours ending 22      Significant Labs:  Lab Results   Component Value Date    GROUPTRH O POS 2022    HEPBSAG Negative 2022    STREPBCULT Normal cervicovaginal willem present 2022     Recent Labs   Lab 22  0354   HGB 11.3*   HCT 33.6*       I have personally reviewed all pertinent lab results from the last 24 hours.  Recent Lab Results       None            Physical Exam  Gen: A&O x 4, NAD  CV: normal HR  Lungs: normal resp effort  Breasts: bilaterally soft, non-tender, nipples intact  Abdomen: soft, non-tender, uterus firm at U - 2fb  Perineum: approximated, no edema   Lochia: minimal rubra  Ext: bilaterally no pedal edema and no signs of DVT

## 2022-08-02 NOTE — DISCHARGE SUMMARY
[facility] ([place])  Obstetrics  Discharge Summary      Patient Name: Jolly Collier  MRN: [id number]  Admission Date: 2022  Hospital Length of Stay: 2 days  Discharge Date and Time:  2022  Attending Physician: No att. providers found   Discharging Provider: Lola Molina CNM   Primary Care Provider: Jayshree Farmer MD    HPI: No notes on file        * No surgery found *     Hospital Course:   22 PPD#2: Doing well. Pumping breastmilk for her son in the NICU. Normal involution. Pain well managed with PO ibuprofen. Plan to discharge today.           Final Active Diagnoses:    Diagnosis Date Noted POA    PRINCIPAL PROBLEM:   (spontaneous vaginal delivery) [O80] 2022 Not Applicable    Lactating mother [Z39.1] 2022 Not Applicable     labor [O60.00] 2022 Yes      Problems Resolved During this Admission:        Significant Diagnostic Studies: Labs: All labs within the past 24 hours have been reviewed      Feeding Method: Pumping breastmilk    Immunizations     Date Immunization Status Dose Route/Site Given by    22 0704 MMR Deferred 0.5 mL Subcutaneous/ Ami Cotton RN    22 0718 Tdap Deferred 0.5 mL Intramuscular/ Ami Cotton RN          Delivery:    Episiotomy: None   Lacerations: None   Repair suture:     Repair # of packets: 1   Blood loss (ml):       Birth information:  YOB: 2023   Time of birth: 11:53 PM   Sex: male   Delivery type: Vaginal, Spontaneous   Gestational Age: 33w2d    Delivery Clinician:      Other providers:       Additional  information:  Forceps:    Vacuum:    Breech:    Observed anomalies      Living?:           APGARS  One minute Five minutes Ten minutes   Skin color:         Heart rate:         Grimace:         Muscle tone:         Breathing:         Totals: 8  8        Placenta: Delivered:       appearance    Pending Diagnostic Studies:     Procedure Component Value Units Date/Time    Specimen to  Pathology, Surgery Gynecology and Obstetrics [[id number]] Collected: 07/31/22 0008    Order Status: Sent Lab Status: In process Updated: 08/01/22 0706    Specimen: Tissue           Discharged Condition: stable    Disposition: Home or Self Care    Follow Up:   Follow-up Information     [facility] Schedule an appointment as soon as possible for a visit in 6 week(s).    Specialty: Obstetrics and Gynecology  Why: Routine postpartum visit  Contact information:  [address]  [facility] [address] [phone]  Additional information:  [facility] 4th Floor   Please park in [place] [address] and use [place] elevators                     Patient Instructions:      BREAST PUMP FOR HOME USE     Order Specific Question Answer Comments   Type of pump: Electric    Weight: 78.5 kg (173 lb 1 oz)    Length of need (1-99 months): 99      Diet Adult Regular     Notify your health care provider if you experience any of the following:  temperature >100.4     Notify your health care provider if you experience any of the following:  persistent nausea and vomiting or diarrhea     Notify your health care provider if you experience any of the following:  severe uncontrolled pain     Notify your health care provider if you experience any of the following:  redness, tenderness, or signs of infection (pain, swelling, redness, odor or green/yellow discharge around incision site)     Notify your health care provider if you experience any of the following:  difficulty breathing or increased cough     Notify your health care provider if you experience any of the following:  severe persistent headache     Notify your health care provider if you experience any of the following:  worsening rash     Notify your health care provider if you experience any of the following:  persistent dizziness, light-headedness, or visual disturbances     Notify your health care provider  if you experience any of the following:  increased confusion or weakness     Activity as tolerated       Follow Up/Patient Instructions:   1. Reviewed postpartum recommendations and precautions ~ encouraged to call for fever, severe or increased pain in head, chest, abdomen, perineum or legs; heavy bleeding, foul smelling lochia, signs of depression/anxiety, or any other concern. Reviewed postpartum precautions r/t high blood pressure ~ encouraged to call for HA, vision changes, epigastric discomfort, or abnormal swelling.  2. Rx provided: n/a  3. Contraception: considering Paragard; will f/u at postpartum visit. Encouraged abstinence and pelvic rest for healing until after postpartum check-up.   4. Encouraged to continue PNV while breastfeeding or at least for 6 weeks postpartum.  5. Car seat law will be reviewed with patient at discharge per protocol  6. RTO in 4-6 weeks or sooner prn.  7. Discharge home today with written and verbal postpartum instructions and precautions.       Medications:  Discharge Medication List as of 8/1/2022  2:55 PM      CONTINUE these medications which have NOT CHANGED    Details   prenatal vit/iron fum/folic ac (PRENATAL 1+1 ORAL) Take by mouth., Historical Med         STOP taking these medications       dextroamphetamine-amphetamine (ADDERALL) 20 mg tablet Comments:   Reason for Stopping:         valACYclovir (VALTREX) 500 MG tablet Comments:   Reason for Stopping:               Lola Molina CNM  Obstetrics  [facility]

## 2022-08-02 NOTE — PROGRESS NOTES
[facility] ([place])  Obstetrics  Postpartum Progress Note    Patient Name: Jolly Collier  MRN: [id number]  Admission Date: 2022  Hospital Length of Stay: 2 days  Attending Physician: No att. providers found  Primary Care Provider: Jayshree Farmer MD    Subjective:     Principal Problem: (spontaneous vaginal delivery)    Hospital Course:  22 PPD#2: Doing well. Pumping breastmilk for her son in the NICU. Normal involution. Pain well managed with PO ibuprofen. Plan to discharge today.      Interval History:     Doing well, ambulating, voiding, and tolerating regular diet  Lochia: steadily decreasing  Pain: well controlled with PO ibuprofen  Breasts/nipples: Pumping breastmilk for her son in NICU  Depression/anxiety: Denies   Support at home: Yes  Contraception: considering Paragard; understands that progesterone only options are appropriate with breastfeeding  [place]: Baby boy is in NICU ()       Objective:     Vital Signs (Most Recent):  Temp: 97.7 °F (36.5 °C) (22 1633)  Pulse: 104 (22 1633)  Resp: 18 (22 1633)  BP: 118/69 (22 1633)  SpO2: 96 % (22 1633) Vital Signs (24h Range):  Temp:  [97.7 °F (36.5 °C)-98.1 °F (36.7 °C)] 97.7 °F (36.5 °C)  Pulse:  [102-106] 104  Resp:  [16-18] 18  SpO2:  [96 %-98 %] 96 %  BP: (104-126)/(51-78) 118/69     Weight: 78.5 kg (173 lb 1 oz)  Body mass index is 32.7 kg/m².    No intake or output data in the 24 hours ending 22      Significant Labs:  Lab Results   Component Value Date    GROUPTRH O POS 2022    HEPBSAG Negative 2022    STREPBCULT Normal cervicovaginal willem present 2022     Recent Labs   Lab 22  0354   HGB 11.3*   HCT 33.6*       I have personally reviewed all pertinent lab results from the last 24 hours.  Recent Lab Results       None            Physical Exam  Gen: A&O x 4, NAD  CV: normal HR  Lungs: normal resp effort  Breasts: bilaterally soft, non-tender, nipples  intact  Abdomen: soft, non-tender, uterus firm at U - 2fb  Perineum: approximated, no edema   Lochia: minimal rubra  Ext: bilaterally no pedal edema and no signs of DVT    Assessment/Plan:     21 y.o. female  for:    *  (spontaneous vaginal delivery)  22 PPD#2: Doing well. Pumping breastmilk for her son in the NICU. Normal involution. Pain well managed with PO ibuprofen. Plan to discharge today.    Lactating mother  22 PPD#2: Pumping breastmilk for her baby in NICU.        Disposition: As patient meets milestones, will plan to discharge today.    Lola Molina CNM  Obstetrics  [facility]

## 2022-08-02 NOTE — ASSESSMENT & PLAN NOTE
8/1/22 PPD#2: Doing well. Pumping breastmilk for her son in the NICU. Normal involution. Pain well managed with PO ibuprofen. Plan to discharge today.

## 2022-08-04 LAB — BACTERIA SPEC AEROBE CULT: ABNORMAL

## 2022-08-07 LAB
FINAL PATHOLOGIC DIAGNOSIS: NORMAL
Lab: NORMAL

## 2022-08-17 ENCOUNTER — PATIENT MESSAGE (OUTPATIENT)
Dept: MATERNAL FETAL MEDICINE | Facility: CLINIC | Age: 22
End: 2022-08-17
Payer: MEDICAID

## 2022-08-20 ENCOUNTER — PATIENT MESSAGE (OUTPATIENT)
Dept: OBSTETRICS AND GYNECOLOGY | Facility: CLINIC | Age: 22
End: 2022-08-20
Payer: MEDICAID

## 2022-08-31 ENCOUNTER — TELEPHONE (OUTPATIENT)
Dept: OBSTETRICS AND GYNECOLOGY | Facility: HOSPITAL | Age: 22
End: 2022-08-31
Payer: MEDICAID

## 2022-09-01 ENCOUNTER — TELEPHONE (OUTPATIENT)
Dept: OBSTETRICS AND GYNECOLOGY | Facility: HOSPITAL | Age: 22
End: 2022-09-01
Payer: MEDICAID

## 2022-11-09 ENCOUNTER — POSTPARTUM VISIT (OUTPATIENT)
Dept: OBSTETRICS AND GYNECOLOGY | Facility: CLINIC | Age: 22
End: 2022-11-09
Payer: MEDICAID

## 2022-11-09 VITALS
BODY MASS INDEX: 27.91 KG/M2 | WEIGHT: 147.69 LBS | DIASTOLIC BLOOD PRESSURE: 62 MMHG | SYSTOLIC BLOOD PRESSURE: 104 MMHG

## 2022-11-09 DIAGNOSIS — F41.8 POSTPARTUM ANXIETY: ICD-10-CM

## 2022-11-09 DIAGNOSIS — Z30.9 ENCOUNTER FOR CONTRACEPTIVE MANAGEMENT, UNSPECIFIED TYPE: ICD-10-CM

## 2022-11-09 DIAGNOSIS — M54.50 LEFT LOW BACK PAIN, UNSPECIFIED CHRONICITY, UNSPECIFIED WHETHER SCIATICA PRESENT: ICD-10-CM

## 2022-11-09 DIAGNOSIS — Z30.430 ENCOUNTER FOR INSERTION OF COPPER IUD: ICD-10-CM

## 2022-11-09 LAB
B-HCG UR QL: NEGATIVE
BILIRUB SERPL-MCNC: NORMAL MG/DL
BLOOD, POC UA: NORMAL
CTP QC/QA: YES
GLUCOSE UR QL STRIP: NORMAL
KETONES UR QL STRIP: NORMAL
LEUKOCYTE ESTERASE URINE, POC: NORMAL
NITRITE, POC UA: NORMAL
PH, POC UA: 5
PROTEIN, POC: NORMAL
SPECIFIC GRAVITY, POC UA: NORMAL
UROBILINOGEN, POC UA: NORMAL

## 2022-11-09 PROCEDURE — 87591 N.GONORRHOEAE DNA AMP PROB: CPT | Performed by: ADVANCED PRACTICE MIDWIFE

## 2022-11-09 PROCEDURE — 58300 PR INSERT INTRAUTERINE DEVICE: ICD-10-PCS | Mod: S$PBB,,, | Performed by: ADVANCED PRACTICE MIDWIFE

## 2022-11-09 PROCEDURE — 99215 PR OFFICE/OUTPT VISIT, EST, LEVL V, 40-54 MIN: ICD-10-PCS | Mod: S$PBB,,, | Performed by: ADVANCED PRACTICE MIDWIFE

## 2022-11-09 PROCEDURE — 99999 PR PBB SHADOW E&M-EST. PATIENT-LVL III: CPT | Mod: PBBFAC,,, | Performed by: ADVANCED PRACTICE MIDWIFE

## 2022-11-09 PROCEDURE — 58300 INSERT INTRAUTERINE DEVICE: CPT | Mod: S$PBB,,, | Performed by: ADVANCED PRACTICE MIDWIFE

## 2022-11-09 PROCEDURE — 81025 URINE PREGNANCY TEST: CPT | Mod: PBBFAC | Performed by: ADVANCED PRACTICE MIDWIFE

## 2022-11-09 PROCEDURE — 99215 OFFICE O/P EST HI 40 MIN: CPT | Mod: S$PBB,,, | Performed by: ADVANCED PRACTICE MIDWIFE

## 2022-11-09 PROCEDURE — 81003 URINALYSIS AUTO W/O SCOPE: CPT | Mod: PBBFAC | Performed by: ADVANCED PRACTICE MIDWIFE

## 2022-11-09 PROCEDURE — 58300 INSERT INTRAUTERINE DEVICE: CPT | Mod: PBBFAC | Performed by: ADVANCED PRACTICE MIDWIFE

## 2022-11-09 PROCEDURE — 99999 PR PBB SHADOW E&M-EST. PATIENT-LVL III: ICD-10-PCS | Mod: PBBFAC,,, | Performed by: ADVANCED PRACTICE MIDWIFE

## 2022-11-09 PROCEDURE — 87086 URINE CULTURE/COLONY COUNT: CPT | Performed by: ADVANCED PRACTICE MIDWIFE

## 2022-11-09 PROCEDURE — 59430 PR CARE AFTER DELIVERY ONLY: ICD-10-PCS | Mod: S$PBB,,, | Performed by: ADVANCED PRACTICE MIDWIFE

## 2022-11-09 PROCEDURE — 88175 CYTOPATH C/V AUTO FLUID REDO: CPT | Performed by: ADVANCED PRACTICE MIDWIFE

## 2022-11-09 PROCEDURE — 99213 OFFICE O/P EST LOW 20 MIN: CPT | Mod: PBBFAC,TH | Performed by: ADVANCED PRACTICE MIDWIFE

## 2022-11-09 PROCEDURE — 87491 CHLMYD TRACH DNA AMP PROBE: CPT | Performed by: ADVANCED PRACTICE MIDWIFE

## 2022-11-09 RX ORDER — ESCITALOPRAM OXALATE 10 MG/1
10 TABLET ORAL DAILY
Qty: 30 TABLET | Refills: 2 | Status: SHIPPED | OUTPATIENT
Start: 2022-11-09 | End: 2023-03-25 | Stop reason: SDUPTHER

## 2022-11-09 RX ADMIN — COPPER 1 INTRA UTERINE DEVICE: 313.4 INTRAUTERINE DEVICE INTRAUTERINE at 12:11

## 2022-11-09 NOTE — PROGRESS NOTES
Postpartum Visit  Jolly Collier is a 22 y.o. female  is here for a postpartum visit. She is 3mo postpartum following a spontaneous vaginal delivery, of a male infant weighinlb 9oz, with Anesthesia: none. . The delivery was at 33w 2d.     Reports dull pain on flank - left side, for approx 2w. Spontaneously appeared with bad back pain, and has improved some over the last week. Reports taking Tylenol and then Aleve with some relief. Achey in nature.    Pregnancy was complicated by:  labor and delivery, fetal pyelectasis on ultrasound, previous daughter born with congenital abnormality - vein of sofia malformation     Postpartum course has been uncomplicated.  Bowel/ bladder function is normal.     Bleeding no bleeding - resumed menses last week. LMP: 22.  Patient is sexually active.   Desired contraception method is paragaurd. Reports unprotected intercourse last night (no intercourse prior to that since prior to LMP last week), desires emergency contraception and an IUD today.      Her last pap was: 2019 - normal per pt report.    Postpartum depression screening: negative. EPDS 12.  Afraid to drive, afraid to leave the house, reports difficulty making decisions secondary to anxiety about them being the right choice. Reports has had some anxiety in the past but it has worsened following this birth. Reports history of what she suspects were panic attacks in high school, but has not had any episode like that recently. Reports history of ADHD and desires referral to Psychiatrist to further discuss options for treatment of this as well. Desires to start antianxiety meds today. Interested in therapy as well.    Baby's course was complicated by  birth - was hospitalized in the NICU for approx a mo after delivery. Home now and doing well. Baby is feeding by both breast and bottle - she is breast feeding and supplementing with formula - approx 1/2 and 1/2.     ROS:  GENERAL: No fever,  chills, fatigability.  VULVAR: No pain, no lesions and no itching.  VAGINAL: No relaxation, no itching, no discharge, no abnormal bleeding and no lesions.  ABDOMEN: No abdominal pain. Denies nausea. Denies vomiting. No diarrhea. No constipation  BREAST: Denies pain. No lumps. No discharge.  URINARY: No incontinence, no nocturia, no frequency and no dysuria.  CARDIOVASCULAR: No chest pain. No shortness of breath. No leg cramps.  NEUROLOGICAL: No headaches. No vision changes.    Past Medical History:   Diagnosis Date    Herpes 2017    genital     Past Surgical History:   Procedure Laterality Date    lasix  2020    eyes    WISDOM TOOTH EXTRACTION  2015    no complications     Review of patient's allergies indicates:   Allergen Reactions    Amoxicillin     Penicillins        Current Outpatient Medications:     prenatal vit/iron fum/folic ac (PRENATAL 1+1 ORAL), Take by mouth., Disp: , Rfl:     EScitalopram oxalate (LEXAPRO) 10 MG tablet, Take 1 tablet (10 mg total) by mouth once daily., Disp: 30 tablet, Rfl: 2  No current facility-administered medications for this visit.      Vitals:    11/09/22 1046   BP: 104/62       General appearance - alert, well appearing, and in no distress and oriented to person, place, and time  Mental status - alert, oriented to person, place, and time, normal mood, behavior, speech, dress, motor activity, and thought processes  Skin - coloration normal for race, good turgor, warm to touch, no rashes  Abdomen - soft, nontender, nondistended, no masses or organomegaly  Pelvic -   External genitalia postpartum: normal, well-healed, without lesions or masses.  Normal female hair distribution. Adequate perineal body. Urethral meatus without lesions or prolapse. Urethra: no masses, tenderness, or scarring.  Bladder: without tenderness or masses.  Vaginal mucosa moist and pink, normal rugae, without lesions, abnormal discharge, or foul odor.  Cervix pink, no lesions, no cervical motion tenderness.   Uterus: midline, non tender, smooth, not enlarged, not prolapsed  No adnexal masses or tenderness.  Extremities - no edema, redness or tenderness in the calves or thighs      Jolly was seen today for postpartum care.    Diagnoses and all orders for this visit:    Routine postpartum follow-up  -     Liquid-Based Pap Smear, Screening    Left low back pain, unspecified chronicity, unspecified whether sciatica present  -     POCT Urine Pregnancy  -     POCT URINALYSIS  -     CULTURE, URINE  -     Sounds musculoskeletal in nature - reviewed proper body mechanics with breastfeeding, lifting, etc, along with warning s/s to follow up with.    Encounter for contraceptive management, unspecified type    Encounter for insertion of copper IUD  -     copper intrauterine device 380 square mm IUD 1 Intra Uterine Device  -     C. trachomatis/N. gonorrhoeae by AMP DNA    Postpartum anxiety  -     EScitalopram oxalate (LEXAPRO) 10 MG tablet; Take 1 tablet (10 mg total) by mouth once daily.  -     Ambulatory referral/consult to Psychiatry; Future  Spoke to patient at length about SE profile of medications used for anxiety/depression. Explained that benefits of medications typically take 2-4 weeks to occur and up to 8-12 weeks. Instructed not to stop medication abruptly. Patient educated about adjunct treatments including but not limited to counseling, avoidance of stimulants including caffeine, participation in regular exercise, yoga, meditation, and breathing exercises. Patient voiced understanding.    Extensive counseling regarding emergency contraception and discussed risks/benefits to this. She desires to proceed with IUD placement today - see procedure notes  Counseling regarding resuming normal activities of exercise and work.  Postpartum precautions reviewed    Routine follow up in 1 yr

## 2022-11-09 NOTE — PROCEDURES
Procedures  Jolly Collier is a 22 y.o. female  presents for IUD placement.  Patient's last menstrual period was 2022 (approximate)..  She desires ParaGard.  UPT is negative.      She was counseled on the risks, benefits, indications, and alternatives to IUD use.  She understands that with insertion there is a risk of bleeding, infection, and uterine perforation.  All questions are answered.  Consents signed.  Cervical cultures were performed.    Procedure:  Time out performed.  The cervix was visualized with a speculum.  A single tooth tenaculum was placed on the anterior lip of the cervix.  The uterus sounds to  7.5 cm using sterile technique.  A ParaGard, Lot # [id number], Expiration date 2028 was loaded and placed high in the uterine fundus without difficulty using sterile technique.  The strings were then trimmed.  The tenaculum and speculum were removed.  The patient tolerated the procedure well.    Assessment:  Jolly was seen today for postpartum care.    Diagnoses and all orders for this visit:    Routine postpartum follow-up  -     Liquid-Based Pap Smear, Screening    Left low back pain, unspecified chronicity, unspecified whether sciatica present  -     POCT Urine Pregnancy  -     POCT URINALYSIS  -     CULTURE, URINE    Encounter for contraceptive management, unspecified type    Encounter for insertion of copper IUD  -     copper intrauterine device 380 square mm IUD 1 Intra Uterine Device  -     C. trachomatis/N. gonorrhoeae by AMP DNA          Post IUD placement counseling:  Manage post IUD placement pain with NSAIDS or Tylenol.  IUD danger signs and how to check for strings were discussed.  The IUD needs to be removed in 10 years for Copper IUD.    Counseling lasted approximately 15 minutes and all her questions were answered.    Follow up:  4 weeks.

## 2022-11-10 ENCOUNTER — PATIENT MESSAGE (OUTPATIENT)
Dept: OBSTETRICS AND GYNECOLOGY | Facility: CLINIC | Age: 22
End: 2022-11-10
Payer: MEDICAID

## 2022-11-10 LAB
BACTERIA UR CULT: NO GROWTH
C TRACH DNA SPEC QL NAA+PROBE: NOT DETECTED
N GONORRHOEA DNA SPEC QL NAA+PROBE: NOT DETECTED

## 2022-11-16 ENCOUNTER — TELEPHONE (OUTPATIENT)
Dept: OBSTETRICS AND GYNECOLOGY | Facility: CLINIC | Age: 22
End: 2022-11-16
Payer: MEDICAID

## 2022-11-17 ENCOUNTER — PATIENT MESSAGE (OUTPATIENT)
Dept: OBSTETRICS AND GYNECOLOGY | Facility: CLINIC | Age: 22
End: 2022-11-17
Payer: MEDICAID

## 2022-11-21 ENCOUNTER — PATIENT MESSAGE (OUTPATIENT)
Dept: OBSTETRICS AND GYNECOLOGY | Facility: CLINIC | Age: 22
End: 2022-11-21

## 2024-07-11 ENCOUNTER — HOSPITAL ENCOUNTER (EMERGENCY)
Facility: HOSPITAL | Age: 24
Discharge: HOME | End: 2024-07-11
Attending: EMERGENCY MEDICINE
Payer: MEDICAID

## 2024-07-11 VITALS
HEART RATE: 97 BPM | HEIGHT: 62 IN | SYSTOLIC BLOOD PRESSURE: 108 MMHG | WEIGHT: 111 LBS | BODY MASS INDEX: 20.43 KG/M2 | DIASTOLIC BLOOD PRESSURE: 60 MMHG | TEMPERATURE: 98 F | OXYGEN SATURATION: 97 % | RESPIRATION RATE: 18 BRPM

## 2024-07-11 DIAGNOSIS — M25.539 WRIST PAIN: Primary | ICD-10-CM

## 2024-07-11 LAB
B-HCG UR QL: NEGATIVE
CTP QC/QA: YES

## 2024-07-11 PROCEDURE — 25000003 PHARM REV CODE 250: Performed by: PHYSICIAN ASSISTANT

## 2024-07-11 PROCEDURE — 99283 EMERGENCY DEPT VISIT LOW MDM: CPT | Mod: 25

## 2024-07-11 PROCEDURE — 81025 URINE PREGNANCY TEST: CPT

## 2024-07-11 PROCEDURE — 29125 APPL SHORT ARM SPLINT STATIC: CPT | Mod: RT

## 2024-07-11 RX ORDER — ACETAMINOPHEN 500 MG
1000 TABLET ORAL
Status: COMPLETED | OUTPATIENT
Start: 2024-07-11 | End: 2024-07-11

## 2024-07-11 RX ORDER — MELOXICAM 7.5 MG/1
7.5 TABLET ORAL DAILY
Qty: 7 TABLET | Refills: 0 | Status: SHIPPED | OUTPATIENT
Start: 2024-07-11

## 2024-07-11 RX ADMIN — ACETAMINOPHEN 1000 MG: 500 TABLET ORAL at 07:07

## 2024-07-12 NOTE — ED PROVIDER NOTES
"Encounter Date: 2024       History     Chief Complaint   Patient presents with    Wrist Pain     Pt c/o right wrist pain x 1 month. No deformities noted      A 23 year old female with no past medical history presents to the ED complaining of R wrist pain for the past month. She states the pain significantly increased this morning when she was pulling a blanket up and heard a "pop." She rates the pain as an 8/10 without ibuprofen and a 5/10 with ibuprofen. She states the swelling has significantly improved since this morning. Denies fever, paresthesia, paralysis, CP, SOB, N/V, syncope. Allergic to penicillin.     The history is provided by the patient. No  was used.     Review of patient's allergies indicates:   Allergen Reactions    Amoxicillin     Penicillins      Past Medical History:   Diagnosis Date    Herpes 2017    genital     Past Surgical History:   Procedure Laterality Date    lasix  2020    eyes    WISDOM TOOTH EXTRACTION      no complications     Family History   Problem Relation Name Age of Onset    No Known Problems Mother      Nephrolithiasis Father      Microcephaly Sister          complications,  at 13    No Known Problems Brother      No Known Problems Maternal Grandmother      No Known Problems Maternal Grandfather      Diabetes Paternal Grandmother      No Known Problems Paternal Grandfather      No Known Problems Sister      No Known Problems Brother      No Known Problems Brother      Breast cancer Neg Hx      Colon cancer Neg Hx      Ovarian cancer Neg Hx       Social History     Tobacco Use    Smoking status: Never    Smokeless tobacco: Never   Substance Use Topics    Alcohol use: No    Drug use: No     Review of Systems   Constitutional:  Negative for fever.   HENT:  Negative for sore throat.    Respiratory:  Negative for shortness of breath.    Cardiovascular:  Negative for chest pain.   Gastrointestinal:  Negative for nausea.   Genitourinary:  Negative for " dysuria.   Musculoskeletal:  Positive for arthralgias, joint swelling and myalgias. Negative for back pain.   Skin:  Negative for color change and rash.   Neurological:  Negative for weakness.   Hematological:  Does not bruise/bleed easily.       Physical Exam     Initial Vitals [07/11/24 1803]   BP Pulse Resp Temp SpO2   116/61 95 18 99.4 °F (37.4 °C) 100 %      MAP       --         Physical Exam    Nursing note and vitals reviewed.  Constitutional: She appears well-developed and well-nourished. She is not diaphoretic. No distress.   HENT:   Head: Normocephalic and atraumatic.   Right Ear: External ear normal.   Left Ear: External ear normal.   Cardiovascular:  Normal rate, regular rhythm and intact distal pulses.           Pulmonary/Chest: Breath sounds normal. No respiratory distress.   Abdominal: She exhibits no distension.   Musculoskeletal:      Right forearm: No swelling, deformity, tenderness or bony tenderness.      Left forearm: No swelling, deformity, tenderness or bony tenderness.      Right wrist: Swelling (mild, radial aspect of wrist) and tenderness (radial aspect of wrist) present. No deformity or snuff box tenderness. Normal range of motion. Normal pulse.      Left wrist: No swelling, deformity, tenderness or snuff box tenderness. Normal range of motion. Normal pulse.      Right hand: No swelling, deformity or tenderness. Normal range of motion. Normal strength. Normal sensation. Normal capillary refill.      Left hand: No swelling, deformity or tenderness. Normal range of motion. Normal strength. Normal sensation. Normal capillary refill.     Neurological: She is alert and oriented to person, place, and time. No sensory deficit. GCS score is 15. GCS eye subscore is 4. GCS verbal subscore is 5. GCS motor subscore is 6.   Skin: Skin is warm and dry.   Psychiatric: She has a normal mood and affect. Her behavior is normal.         ED Course   Procedures  Labs Reviewed   POCT URINE PREGNANCY       "    Imaging Results              X-Ray Wrist Complete Right (Final result)  Result time 07/11/24 18:26:00      Final result by Brad Jean Baptiste MD (07/11/24 18:26:00)                   Impression:      No acute osseous abnormality identified.      Electronically signed by: Brad Jean Baptiste MD  Date:    07/11/2024  Time:    18:26               Narrative:    EXAMINATION:  XR WRIST COMPLETE 3 VIEWS RIGHT    CLINICAL HISTORY:  Pain in unspecified wrist    TECHNIQUE:  PA, lateral, and oblique views of the right wrist were performed.    COMPARISON:  None    FINDINGS:  No evidence of acute displaced fracture, dislocation, or osseous destructive process.  No radiopaque retained foreign body seen.                                       Medications   acetaminophen tablet 1,000 mg (1,000 mg Oral Given 7/11/24 1952)     Medical Decision Making  Encounter Date: 7/11/2024    A 23 year old female with no past medical history presents to the ED complaining of R wrist pain for the past month. She states the pain significantly increased this morning when she was pulling a blanket up and heard a "pop." She rates the pain as an 8/10 without ibuprofen and a 5/10 with ibuprofen. She states the swelling has significantly improved since this morning. Denies fever, paresthesia, paralysis, CP, SOB, N/V, syncope. Allergic to penicillin. .  Hemodynamically stable. Afebrile. Phonating and protecting the airway spontaneously. No clinical evidence for cardiovascular instability or impending airway compromise.  Remainder of physical exam as above. Notable for mild tenderness noted to the radial aspect of the right wrist.  Normal radial pulse.  No snuffbox tenderness.  No color changes.  Normal strength and sensation of the right upper extremity.  Prior medical records reviewed. PMD note reviewed. Current co-morbidities considered that will impact clinical decision making include as above.   Vitals range:   Temp:  [99.4 °F (37.4 °C)] 99.4 °F (37.4 " °C)  Pulse:  [95] 95  Resp:  [18] 18  SpO2:  [100 %] 100 %  BP: (116)/(61) 116/61    Differential diagnoses includes but is not limited to:   Fracture, dislocation, soft tissue injury, carpal tunnel    ED workup initiated with x-ray of the wrist, which resulted negative for any acute abnormality.  Pole Velcro wrist splint applied.  I will send her home with prescription for NSAIDs follow up with Orthopedics.    Clinical picture today most consistent with wrist pain.   Doubt alternate pathology as listed in the differential above.    ED MEDS GIVEN:  Medications  acetaminophen tablet 1,000 mg (1,000 mg Oral Given 7/11/24 1952)    Clinical Impression:  Final diagnoses:  [M25.539] Wrist pain (Primary)         I see no indication of an emergent process beyond that addressed during our encounter but have duly counseled the patient/family regarding the need for prompt follow-up as well as the indications that should prompt immediate return to the emergency room should new or worrisome developments occur.  The patient/family has been provided with verbal and printed direction regarding our final diagnosis(es) as well as instructions regarding use of OTC and/or Rx medications intended to manage the patient's conditions.     The patient/family communicates understanding of all this information and all remaining questions and concerns were addressed at this time.    DISCLAIMER: This note was prepared with [employer] voice recognition transcription software. Garbled syntax, mangled pronouns, and other bizarre constructions may be attributed to that software system.      Risk  OTC drugs.  Prescription drug management.                                      Clinical Impression:  Final diagnoses:  [M25.539] Wrist pain (Primary)          ED Disposition Condition    Discharge Stable          ED Prescriptions       Medication Sig Dispense Start Date End Date Auth. Provider    meloxicam (MOBIC) 7.5 MG tablet Take 1 tablet (7.5 mg total) by  mouth once daily. 7 tablet 7/11/2024 -- Tamiko Herr PA-C          Follow-up Information       Follow up With Specialties Details Why Contact Info    Hue Vitale MD Orthopedic Surgery Schedule an appointment as soon as possible for a visit  For follow up [address] [facility] [address]  [phone]      [facility] - Emergency Dept Emergency Medicine Go to  As needed, If symptoms worsen [address] [facility] [place] [id number] [phone]             Tamiko Herr PA-C  07/11/24 7155

## 2024-07-12 NOTE — DISCHARGE INSTRUCTIONS
Please take the prescribed medications according to the directions on the bottle. You may wear the removable splint provided as needed for comfort. If your symptoms worsen you may return to the ED for reevaluation. Otherwise, please follow up with the orthopedic clinic listed below for further evaluation.

## 2024-07-12 NOTE — ED TRIAGE NOTES
Jolly Collier, a 23 y.o. female presents to the ED w/ complaint of right wrist pain onset 1 month.  Denies falls, trauma, numbness, tingling or any other symptoms.  Pain 5/10.     Triage note:  Chief Complaint   Patient presents with    Wrist Pain     Pt c/o right wrist pain x 1 month. No deformities noted      Review of patient's allergies indicates:   Allergen Reactions    Amoxicillin     Penicillins      Past Medical History:   Diagnosis Date    Herpes 2017    genital

## 2024-12-17 ENCOUNTER — HOSPITAL ENCOUNTER (EMERGENCY)
Facility: HOSPITAL | Age: 24
Discharge: HOME | End: 2024-12-17
Attending: EMERGENCY MEDICINE
Payer: MEDICAID

## 2024-12-17 VITALS
WEIGHT: 117 LBS | BODY MASS INDEX: 21.53 KG/M2 | DIASTOLIC BLOOD PRESSURE: 65 MMHG | TEMPERATURE: 98 F | SYSTOLIC BLOOD PRESSURE: 122 MMHG | OXYGEN SATURATION: 98 % | HEIGHT: 62 IN | HEART RATE: 120 BPM | RESPIRATION RATE: 16 BRPM

## 2024-12-17 DIAGNOSIS — S46.811A STRAIN OF RIGHT TRAPEZIUS MUSCLE, INITIAL ENCOUNTER: ICD-10-CM
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
## 2024-12-17 DIAGNOSIS — S56.911A STRAIN OF RIGHT FOREARM, INITIAL ENCOUNTER: ICD-10-CM

## 2024-12-17 DIAGNOSIS — R00.0 TACHYCARDIA: ICD-10-CM

## 2024-12-17 LAB
B-HCG UR QL: NEGATIVE
CTP QC/QA: YES

## 2024-12-17 PROCEDURE — 99284 EMERGENCY DEPT VISIT MOD MDM: CPT | Mod: 25

## 2024-12-17 PROCEDURE — 81025 URINE PREGNANCY TEST: CPT

## 2024-12-17 PROCEDURE — 93005 ELECTROCARDIOGRAM TRACING: CPT

## 2024-12-17 PROCEDURE — 25000003 PHARM REV CODE 250

## 2024-12-17 PROCEDURE — 93010 ELECTROCARDIOGRAM REPORT: CPT | Mod: ,,, | Performed by: INTERNAL MEDICINE

## 2024-12-17 RX ORDER — NAPROXEN 500 MG/1
500 TABLET ORAL 2 TIMES DAILY
Qty: 30 TABLET | Refills: 0 | Status: SHIPPED | OUTPATIENT
Start: 2024-12-17

## 2024-12-17 RX ORDER — ACETAMINOPHEN 500 MG
1000 TABLET ORAL
Status: COMPLETED | OUTPATIENT
Start: 2024-12-17 | End: 2024-12-17

## 2024-12-17 RX ORDER — METHOCARBAMOL 500 MG/1
1000 TABLET, FILM COATED ORAL 3 TIMES DAILY
Qty: 30 TABLET | Refills: 0 | Status: SHIPPED | OUTPATIENT
Start: 2024-12-17 | End: 2024-12-22

## 2024-12-17 RX ORDER — LIDOCAINE 50 MG/G
1 PATCH TOPICAL DAILY
Qty: 15 PATCH | Refills: 0 | Status: SHIPPED | OUTPATIENT
Start: 2024-12-17

## 2024-12-17 RX ORDER — KETOROLAC TROMETHAMINE 10 MG/1
10 TABLET, FILM COATED ORAL
Status: COMPLETED | OUTPATIENT
Start: 2024-12-17 | End: 2024-12-17

## 2024-12-17 RX ADMIN — KETOROLAC TROMETHAMINE 10 MG: 10 TABLET, FILM COATED ORAL at 05:12

## 2024-12-17 RX ADMIN — ACETAMINOPHEN 1000 MG: 500 TABLET ORAL at 05:12

## 2024-12-17 NOTE — DISCHARGE INSTRUCTIONS
Thank you for coming to our Emergency Department today. It is important to remember that some problems or medical conditions are difficult to diagnose and may not be found or addressed during your Emergency Department visit.  These conditions often start with non-specific symptoms and can only be diagnosed on follow up visits with your primary care physician or specialist when the symptoms continue or change. Please remember that all medical conditions can change, and we cannot predict how you will be feeling tomorrow or the next day. Return to the ER with any questions/concerns, new/concerning symptoms including fever, chest pain, shortness of breath, loss of consciousness, dizziness, weakness, worsening symptoms, failure to improve, or any other concerns. Also, please follow up with your Primary Care Physician and/or Pediatrician in the next 1-2 days to review your ED visit in entirety and for re-evaluation.   Be sure to follow up with your primary care doctor and review all labs/imaging/tests that were performed during your ER visit with them. It is very common for us to identify non-emergent incidental findings which must be followed up with your primary care physician.  Some labs/imaging/tests may be outside of the normal range, and require non-emergent follow-up and/or further investigation/treatment/procedures/testing to help diagnose/exclude/prevent complications or other potentially serious medical conditions. Some abnormalities may not have been discussed or addressed during your ER visit. Some lab results may not return during your ER visit but can be accessible by downloading the free [facility] Mychart cata or by visiting [email or web address] . It is important for you to review all labs/imaging/tests which are outside of the normal range with your physician.  An ER visit does not replace a primary care visit, and many screening tests or follow-up tests cannot be ordered by an ER doctor or performed by  the ER. Some tests may even require pre-approval.  If you do not have a primary care doctor, you may contact the one listed on your discharge paperwork or you may also call the [facility] Appointment Desk at [phone] , or [address] [place] [address] at  [phone] to schedule an appointment, or establish care with a primary care doctor or even a specialist and to obtain information about local resources. It is important to your health that you have a primary care doctor.  Please take all medications as directed. We have done our best to select a medication for you that will treat your condition however, all medications may potentially have side-effects and it is impossible to predict which medications may give you side-effects or what those side-effects (if any) those medications may give you.  If you feel that you are having a negative effect or side-effect of any medication you should stop taking those medications immediately and seek medical attention. If you feel that you are having a life-threatening reaction call 911.  Do not drive, swim, climb to height, take a bath, operate heavy machinery, drink alcohol or take potentially sedating medications, sign any legal documents or make any important decisions for 24 hours if you have received any pain medications, sedatives or mood altering drugs during your ER visit or within 24 hours of taking them if they have been prescribed to you.   You can find additional resources for Dentists, hearing aids, durable medical equipment, low cost pharmacies and other resources at [email or web address]  Patient agrees with this plan. Discussed with her strict return precautions, they verbalized understanding. Patient is stable for discharge.   § Please take all medication as prescribed.  Apply a compressive ACE bandage. Rest and elevate the affected painful area.  Apply cold compresses intermittently as needed.  As pain recedes, begin normal activities slowly as tolerated.  Call  if symptoms persist.     Detail Level: Detailed Size Of Lesion In Cm (Optional): 0.7 X Size Of Lesion In Cm (Optional): 0
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
## 2024-12-19 LAB
OHS QRS DURATION: 74 MS
OHS QTC CALCULATION: 432 MS